# Patient Record
Sex: FEMALE | Race: WHITE | NOT HISPANIC OR LATINO | Employment: UNEMPLOYED | URBAN - METROPOLITAN AREA
[De-identification: names, ages, dates, MRNs, and addresses within clinical notes are randomized per-mention and may not be internally consistent; named-entity substitution may affect disease eponyms.]

---

## 2017-01-17 ENCOUNTER — TELEPHONE (OUTPATIENT)
Dept: MEDICAL GROUP | Age: 52
End: 2017-01-17

## 2017-01-17 NOTE — TELEPHONE ENCOUNTER
1. Caller Name: Kaleigh                                         Call Back Number: [phone] (home)         Patient approves a detailed voicemail message: no    Patient called requesting her labs be sent to her home address.  Address was verified and labs mailed 1/17/17

## 2017-01-31 ENCOUNTER — HOSPITAL ENCOUNTER (OUTPATIENT)
Dept: RADIOLOGY | Facility: MEDICAL CENTER | Age: 52
End: 2017-01-31
Attending: INTERNAL MEDICINE
Payer: COMMERCIAL

## 2017-01-31 DIAGNOSIS — Z78.0 POSTMENOPAUSAL: ICD-10-CM

## 2017-01-31 DIAGNOSIS — R74.8 ALKALINE PHOSPHATASE ELEVATION: ICD-10-CM

## 2017-01-31 PROCEDURE — 77080 DXA BONE DENSITY AXIAL: CPT

## 2017-01-31 PROCEDURE — 76705 ECHO EXAM OF ABDOMEN: CPT

## 2017-02-01 NOTE — PROGRESS NOTES
Quick Note:    I will discuss the result in upcoming appointment.     Maribel Blackman MD    ______

## 2017-03-31 ENCOUNTER — OFFICE VISIT (OUTPATIENT)
Dept: MEDICAL GROUP | Age: 52
End: 2017-03-31
Payer: COMMERCIAL

## 2017-03-31 VITALS
SYSTOLIC BLOOD PRESSURE: 122 MMHG | RESPIRATION RATE: 16 BRPM | HEIGHT: 64 IN | HEART RATE: 74 BPM | BODY MASS INDEX: 26.46 KG/M2 | DIASTOLIC BLOOD PRESSURE: 84 MMHG | OXYGEN SATURATION: 94 % | TEMPERATURE: 98 F | WEIGHT: 155 LBS

## 2017-03-31 DIAGNOSIS — E89.0 POSTSURGICAL HYPOTHYROIDISM: ICD-10-CM

## 2017-03-31 DIAGNOSIS — K59.01 SLOW TRANSIT CONSTIPATION: ICD-10-CM

## 2017-03-31 DIAGNOSIS — F43.23 ADJUSTMENT DISORDER WITH MIXED ANXIETY AND DEPRESSED MOOD: ICD-10-CM

## 2017-03-31 DIAGNOSIS — J06.9 VIRAL UPPER RESPIRATORY TRACT INFECTION: ICD-10-CM

## 2017-03-31 DIAGNOSIS — R23.2 HOT FLASHES: ICD-10-CM

## 2017-03-31 DIAGNOSIS — R74.8 ALKALINE PHOSPHATASE ELEVATION: ICD-10-CM

## 2017-03-31 DIAGNOSIS — Z12.31 VISIT FOR SCREENING MAMMOGRAM: ICD-10-CM

## 2017-03-31 DIAGNOSIS — M85.80 OSTEOPENIA: ICD-10-CM

## 2017-03-31 PROCEDURE — 99214 OFFICE O/P EST MOD 30 MIN: CPT | Performed by: INTERNAL MEDICINE

## 2017-03-31 RX ORDER — CITALOPRAM 20 MG/1
20 TABLET ORAL DAILY
COMMUNITY
End: 2017-03-31

## 2017-03-31 RX ORDER — CITALOPRAM HYDROBROMIDE 10 MG/1
10 TABLET ORAL DAILY
COMMUNITY
End: 2017-12-04

## 2017-03-31 ASSESSMENT — PAIN SCALES - GENERAL: PAINLEVEL: NO PAIN

## 2017-03-31 NOTE — ASSESSMENT & PLAN NOTE
Patient has anxiety, adjustment disorder and mild depressed mood after divorce in 2015. Patient reported that her mood is feeling better and she sleeps better lately. She started taking Celexa 10 mg daily prescribed by psychiatrist. She denied side effects from taking Celexa. She is able to sleep well at night after taking medication. She denies suicidal ideation or plan. She also follows with counselor.

## 2017-03-31 NOTE — ASSESSMENT & PLAN NOTE
Patient reported that her hot flashes improved with Celexa as well. She may know, pulse in February 2015. She has normal Pap smear with gynecologist in June 2016. She also has mammogram in June 2016. We will reorder mammogram for this year. She has normal colonoscopy on 6/19/14.

## 2017-03-31 NOTE — PROGRESS NOTES
Subjective:   Kaleigh Figueroa is a 52 y.o. female here today for evaluation and management of:      Postsurgical hypothyroidism  Patient is taking levothyroxine 88 µg every morning. She tolerates medication without side effect. She follows with endocrinologist, Dr. Saleem. She has thyroid function tests with endocrinologist and stated that the thyroid function test is stable with current regimens. She will have thyroid function tests and follow-up with Dr. Saleem that in April 2017.    Alkaline phosphatase elevation  Patient has elevated alkaline phosphatase at 132 on 12/21/16. She is completely asymptomatic. Repeated thyroid function test was back to normal on 1/17/17. She has negative hepatitis panel on 1/17/17. Her liver ultrasound was normal on 1/31/17.     Adjustment disorder with mixed anxiety and depressed mood  Patient has anxiety, adjustment disorder and mild depressed mood after divorce in 2015. Patient reported that her mood is feeling better and she sleeps better lately. She started taking Celexa 10 mg daily prescribed by psychiatrist. She denied side effects from taking Celexa. She is able to sleep well at night after taking medication. She denies suicidal ideation or plan. She also follows with counselor.     Hot flashes  Patient reported that her hot flashes improved with Celexa as well. She may know, pulse in February 2015. She has normal Pap smear with gynecologist in June 2016. She also has mammogram in June 2016. We will reorder mammogram for this year. She has normal colonoscopy on 6/19/14.    Osteopenia  Patient has DEXA scan on 1/31/17 showed osteopenia. She has not taken vitamin D supplements regularly. She reported that she started having regular physical exercise at gym.    Slow transit constipation  Patient reported that her constipation improved by taking prune. She has Mirilax at home and she takes it as needed only. She does not take Senokot as she does not find it in pharmacy store.  "Overall, her constipation improved.    Viral upper respiratory tract infection  Patient reports that she has mild prostatic cough with runny nose started 3 days ago after she came back from trip. She does not feel short of breath or wheezing. She has mild chest congestion yesterday and subjective feverish and chills. She tries nasal steam treatment. She feels that her symptom is mild and she can manage with over-the-counter cold medications.         Current medicines (including changes today)  Current Outpatient Prescriptions   Medication Sig Dispense Refill   • citalopram (CELEXA) 10 MG tablet Take 10 mg by mouth every day.     • polyethylene glycol/lytes (MIRALAX) Pack Take 17 g by mouth every day.     • Levothyroxine Sodium (SYNTHROID PO) Take 88 mcg by mouth.       No current facility-administered medications for this visit.     She  has a past medical history of Thyroid disease.    ROS   No chest pain, no shortness of breath, no abdominal pain       Objective:     Blood pressure 122/84, pulse 74, temperature 36.7 °C (98 °F), resp. rate 16, height 1.626 m (5' 4\"), weight 70.308 kg (155 lb), last menstrual period 02/01/2016, SpO2 94 %, not currently breastfeeding. Body mass index is 26.59 kg/(m^2).   Physical Exam:  General: Alert, oriented and no acute distress.  Eye contact is good, speech goal directed, affect calm  HEENT: conjunctiva non-injected, sclera non-icteric.  Oral mucous membranes pink and moist with no lesions.  Pinna normal.   Lungs: Normal respiratory effort, clear to auscultation bilaterally with good excursion.  CV: regular rate and rhythm. No murmurs.   Abdomen: soft, non distended, nontender, Bowel sound normal.  Ext: no edema, color normal, vascularity normal, temperature normal        Assessment and Plan:   The following treatment plan was discussed     1. Postsurgical hypothyroidism  - Well-controlled. Continue current regimens. Follows with endocrinologist.  - CBC WITH DIFFERENTIAL; " Future  - TSH; Future  - FREE THYROXINE; Future    2. Alkaline phosphatase elevation  - Improved. Liver ultrasound on 1/31/17 within normal.  - COMP METABOLIC PANEL; Future    3. Adjustment disorder with mixed anxiety and depressed mood  - Well-controlled. Continue current regimen, Celexa 10 mg daily. Recheck lab 1-2 weeks before next follow up visit.  - Discussed with patient regarding the use and side effects of medication as well as black box warning of suicidality risk with medication. Patient verbally understands. Recommend to call 911 or go to ER if patient has suicidal ideation or plan.   - Continue to follow with psychiatrist and counselor as scheduled.    4. Hot flashes  - Improved with Celexa.  - CBC WITH DIFFERENTIAL; Future  - COMP METABOLIC PANEL; Future    5. Osteopenia  - Advised to take vitamin D and calcium supplements daily and to do regular weightbearing exercise.  - VITAMIN D,25 HYDROXY; Future    6. Slow transit constipation  - Improved. Advised to eat more fiber and increase water intake.    7. Viral upper respiratory tract infection  - Likely viral etiology. Patient wants to do over-the-counter regimen for symptomatic treatment. Discussed the nature of disease, contagiousness, prevention of transmission of infection, management of cold symptoms.   - Recommend well hydration with fluid and gurgling with warm salt water every morning and 2-3 times through out the day. Advised to avoid spicy food, alcohol, cold drinks, oily and greasy food until symptoms completely resolve.   - Recommend flonase nasal spray for nasal congestion.   - Recommend to try Cepacol lozenges or spray for sore throat.   - Also discussed the possible secondary bacterial infection develops if symptoms get worse with above cold treatment for 1 week.  - Discussed ED precautions with patient: seek emergency evaluation for symptoms including but not limited to: crushing chest pain, chest pain associated with difficulty  breathing, nausea, or sweats, heart rate irregular or too fast to count, wheezing, high fever, abdominal pain, nausea, vomiting, diarrhea.   - Any change or worsening of signs or symptoms, patient encouraged to follow-up or report to emergency room for further evaluation. Patient understands and agrees.      8. Visit for screening mammogram  - Order screening mammogram.  - MA-SCREEN MAMMO W/CAD-BILAT; Future    Face-to-face time spent 30 minutes with patient and more than half of that time spent for counseling and cooperating of care for medical problems listed above.        Followup: Return in about 9 months (around 12/31/2017), or if symptoms worsen or fail to improve, for hypothyroid, hot flashes, adjustment disorder with anxiety and depressed mood, constipation, osteope.      Please note that this dictation was created using voice recognition software. I have made every reasonable attempt to correct obvious errors, but I expect that there may have unintended errors in text, spelling, punctuation, or grammar that I did not discover.

## 2017-03-31 NOTE — ASSESSMENT & PLAN NOTE
Patient reported that her constipation improved by taking prune. She has Mirilax at home and she takes it as needed only. She does not take Senokot as she does not find it in pharmacy store. Overall, her constipation improved.

## 2017-03-31 NOTE — Clinical Note
[facility]  Maribel Blackman M.D.  [address]  [place] [address]  Fax: [phone]   Authorization for Release/Disclosure of   Protected Health Information   Name: KALEIGH FIGUEROA : 1965 SSN: [id number]   Address: [address] [place] [id number] Phone:    [phone] (home)    I authorize the entity listed below to release/disclose the PHI below to:   [facility]/Maribel Blackman M.D. and Maribel Blackman M.D.   Provider or Entity Name:  [facility]   Address   [facility], [facility], [facility]   Phone:  [phone]    Fax:     Reason for request: continuity of care   Information to be released:    [  ] LAST COLONOSCOPY,  including any PATH REPORT and follow-up  [  ] LAST FIT/COLOGUARD RESULT [  ] LAST DEXA  [  ] LAST MAMMOGRAM  [  ] LAST PAP  [  ] LAST LABS [  ] RETINA EXAM REPORT  [  ] IMMUNIZATION RECORDS  [  ] Release all info      [  ] Check here and initial the line next to each item to release ALL health information INCLUDING  _____ Care and treatment for drug and / or alcohol abuse  _____ HIV testing, infection status, or AIDS  _____ Genetic Testing    DATES OF SERVICE OR TIME PERIOD TO BE DISCLOSED: _____________  I understand and acknowledge that:  * This Authorization may be revoked at any time by you in writing, except if your health information has already been used or disclosed.  * Your health information that will be used or disclosed as a result of you signing this authorization could be re-disclosed by the recipient. If this occurs, your re-disclosed health information may no longer be protected by State or Federal laws.  * You may refuse to sign this Authorization. Your refusal will not affect your ability to obtain treatment.  * This Authorization becomes effective upon signing and will  on (date) __________.      If no date is indicated, this Authorization will  one (1) year from the signature date.    Name: Kaleigh Figueroa    Signature:   Date:        3/31/2017       PLEASE FAX REQUESTED RECORDS BACK TO: [phone]

## 2017-03-31 NOTE — ASSESSMENT & PLAN NOTE
Patient is taking levothyroxine 88 µg every morning. She tolerates medication without side effect. She follows with endocrinologist, Dr. Saleem. She has thyroid function tests with endocrinologist and stated that the thyroid function test is stable with current regimens. She will have thyroid function tests and follow-up with Dr. Saleem that in April 2017.

## 2017-03-31 NOTE — MR AVS SNAPSHOT
"Kaleigh Figueroa   3/31/2017 8:40 AM   Office Visit   MRN: [id number]    Department:  [address] [place] [address]   Dept Phone:  [phone]    Description:  Female : 1965   Provider:  Maribel Blackman M.D.           Reason for Visit     Follow-Up labs, cough with yellow phlegm      Allergies as of 3/31/2017     Allergen Noted Reactions    Codeine 10/27/2016   Itching    Vicodin [Hydrocodone-Acetaminophen] 2015   Itching, Anxiety      You were diagnosed with     Postsurgical hypothyroidism   [244.0.ICD-9-CM]       Alkaline phosphatase elevation   [454038]       Adjustment disorder with mixed anxiety and depressed mood   [309.28.ICD-9-CM]       Hot flashes   [358798]       Visit for screening mammogram   [003428]       Osteopenia   [917893]         Vital Signs     Blood Pressure Pulse Temperature Respirations Height Weight    122/84 mmHg 74 36.7 °C (98 °F) 16 1.626 m (5' 4\") 70.308 kg (155 lb)    Body Mass Index Oxygen Saturation Last Menstrual Period Breastfeeding? Smoking Status       26.59 kg/m2 94% 2016 No Never Smoker        Basic Information     Date Of Birth Sex Race Ethnicity Preferred Language    1965 Female White Non- English      Your appointments     Mar 31, 2017  8:40 AM   Established Patient with Maribel Blackman M.D.   [address] [address] [phone]           You will be receiving a confirmation call a few days before your appointment from our automated call confirmation system.            Dec 29, 2017  8:20 AM   Established Patient with Maribel Blackman M.D.   [address] [address] [phone]           You will be receiving a confirmation call a few days before your appointment from our automated call confirmation system.              Problem List              ICD-10-CM Priority Class Noted - Resolved    Postsurgical hypothyroidism " E89.0   10/27/2016 - Present    Adjustment disorder with mixed anxiety and depressed mood F43.23   10/27/2016 - Present    Hot flashes R23.2   10/27/2016 - Present    Acute cystitis without hematuria N30.00   12/23/2016 - Present    Slow transit constipation K59.01   12/23/2016 - Present    Acute bilateral low back pain without sciatica M54.5   12/23/2016 - Present    Alkaline phosphatase elevation R74.8   12/23/2016 - Present    Osteopenia M85.80   3/31/2017 - Present      Health Maintenance        Date Due Completion Dates    MAMMOGRAM 3/19/2004 ---    PAP SMEAR 6/16/2019 6/16/2016    COLONOSCOPY 6/19/2024 6/19/2014    IMM DTaP/Tdap/Td Vaccine (2 - Td) 12/11/2025 12/11/2015            Current Immunizations     Influenza Vaccine Quad Inj (Preserved) 10/27/2016  4:29 PM    Tdap Vaccine 12/11/2015      Below and/or attached are the medications your provider expects you to take. Review all of your home medications and newly ordered medications with your provider and/or pharmacist. Follow medication instructions as directed by your provider and/or pharmacist. Please keep your medication list with you and share with your provider. Update the information when medications are discontinued, doses are changed, or new medications (including over-the-counter products) are added; and carry medication information at all times in the event of emergency situations     Allergies:  CODEINE - Itching     VICODIN - Itching,Anxiety               Medications  Valid as of: March 31, 2017 -  8:31 AM    Generic Name Brand Name Tablet Size Instructions for use    Citalopram Hydrobromide (Tab) CELEXA 10 MG Take 10 mg by mouth every day.        Levothyroxine Sodium   Take 88 mcg by mouth.        Polyethylene Glycol 3350 (Pack) MIRALAX  Take 17 g by mouth every day.        .                 Medicines prescribed today were sent to:     [id number] - [place], [place] - [address] [address]    Phone:  [phone] Fax: [phone]    Open 24 Hours?: No      Medication refill instructions:       If your prescription bottle indicates you have medication refills left, it is not necessary to call your provider’s office. Please contact your pharmacy and they will refill your medication.    If your prescription bottle indicates you do not have any refills left, you may request refills at any time through one of the following ways: The online Cerephex system (except Urgent Care), by calling your provider’s office, or by asking your pharmacy to contact your provider’s office with a refill request. Medication refills are processed only during regular business hours and may not be available until the next business day. Your provider may request additional information or to have a follow-up visit with you prior to refilling your medication.   *Please Note: Medication refills are assigned a new Rx number when refilled electronically. Your pharmacy may indicate that no refills were authorized even though a new prescription for the same medication is available at the pharmacy. Please request the medicine by name with the pharmacy before contacting your provider for a refill.        Your To Do List     Future Labs/Procedures Complete By Expires    CBC WITH DIFFERENTIAL  As directed 4/1/2018    COMP METABOLIC PANEL  As directed 4/1/2018    FREE THYROXINE  As directed 4/1/2018    MA-SCREEN MAMMO W/CAD-BILAT  As directed 5/2/2018    TSH  As directed 4/1/2018    VITAMIN D,25 HYDROXY  As directed 4/1/2018         Cerephex Access Code: [email or web address]  Expires: 4/30/2017  8:31 AM    Cerephex  A secure, online tool to manage your health information     [employer]® is a secure, online tool that connects you to your personalized health information from the privacy of your home -- day or night - making it very easy for you to manage your healthcare. Once the activation process is completed, you can even access your medical  information using the Adapx manjit, which is available for free in the Apple Manjit store or Google Play store.     Adapx provides the following levels of access (as shown below):   My Chart Features   [facility] Doctor [facility]  Specialists [facility]  Urgent  Care Non-[facility]  Primary Care  Doctor   Email your healthcare team securely and privately 24/7 X X X    Manage appointments: schedule your next appointment; view details of past/upcoming appointments X      Request prescription refills. X      View recent personal medical records, including lab and immunizations X X X X   View health record, including health history, allergies, medications X X X X   Read reports about your outpatient visits, procedures, consult and ER notes X X X X   See your discharge summary, which is a recap of your hospital and/or ER visit that includes your diagnosis, lab results, and care plan. X X       How to register for Adapx:  1. Go to  [email or web address].  2. Click on the Sign Up Now box, which takes you to the New Member Sign Up page. You will need to provide the following information:  a. Enter your Adapx Access Code exactly as it appears at the top of this page. (You will not need to use this code after you’ve completed the sign-up process. If you do not sign up before the expiration date, you must request a new code.)   b. Enter your date of birth.   c. Enter your home email address.   d. Click Submit, and follow the next screen’s instructions.  3. Create a Adapx ID. This will be your Adapx login ID and cannot be changed, so think of one that is secure and easy to remember.  4. Create a Adapx password. You can change your password at any time.  5. Enter your Password Reset Question and Answer. This can be used at a later time if you forget your password.   6. Enter your e-mail address. This allows you to receive e-mail notifications when new information is available in Adapx.  7. Click Sign Up. You can now  view your health information.    For assistance activating your [employer] account, call [phone]

## 2017-03-31 NOTE — ASSESSMENT & PLAN NOTE
Patient reports that she has mild prostatic cough with runny nose started 3 days ago after she came back from trip. She does not feel short of breath or wheezing. She has mild chest congestion yesterday and subjective feverish and chills. She tries nasal steam treatment. She feels that her symptom is mild and she can manage with over-the-counter cold medications.

## 2017-03-31 NOTE — ASSESSMENT & PLAN NOTE
Patient has DEXA scan on 1/31/17 showed osteopenia. She has not taken vitamin D supplements regularly. She reported that she started having regular physical exercise at gym.

## 2017-03-31 NOTE — ASSESSMENT & PLAN NOTE
Patient has elevated alkaline phosphatase at 132 on 12/21/16. She is completely asymptomatic. Repeated thyroid function test was back to normal on 1/17/17. She has negative hepatitis panel on 1/17/17. Her liver ultrasound was normal on 1/31/17.

## 2017-04-05 ENCOUNTER — HOSPITAL ENCOUNTER (OUTPATIENT)
Dept: RADIOLOGY | Facility: MEDICAL CENTER | Age: 52
End: 2017-04-05

## 2017-06-01 ENCOUNTER — OFFICE VISIT (OUTPATIENT)
Dept: MEDICAL GROUP | Age: 52
End: 2017-06-01
Payer: COMMERCIAL

## 2017-06-01 VITALS
SYSTOLIC BLOOD PRESSURE: 122 MMHG | DIASTOLIC BLOOD PRESSURE: 82 MMHG | BODY MASS INDEX: 25.95 KG/M2 | HEIGHT: 64 IN | OXYGEN SATURATION: 97 % | TEMPERATURE: 97.5 F | HEART RATE: 91 BPM | WEIGHT: 152 LBS

## 2017-06-01 DIAGNOSIS — F43.23 ADJUSTMENT DISORDER WITH MIXED ANXIETY AND DEPRESSED MOOD: ICD-10-CM

## 2017-06-01 DIAGNOSIS — E89.0 POSTSURGICAL HYPOTHYROIDISM: ICD-10-CM

## 2017-06-01 DIAGNOSIS — T07.XXXA MULTIPLE BRUISES: ICD-10-CM

## 2017-06-01 DIAGNOSIS — K59.01 SLOW TRANSIT CONSTIPATION: ICD-10-CM

## 2017-06-01 PROBLEM — J06.9 VIRAL UPPER RESPIRATORY TRACT INFECTION: Status: RESOLVED | Noted: 2017-03-31 | Resolved: 2017-06-01

## 2017-06-01 PROCEDURE — 99214 OFFICE O/P EST MOD 30 MIN: CPT | Performed by: INTERNAL MEDICINE

## 2017-06-01 ASSESSMENT — PAIN SCALES - GENERAL: PAINLEVEL: NO PAIN

## 2017-06-02 NOTE — ASSESSMENT & PLAN NOTE
Patient is taking Celexa 10 mg daily. She stated that her mood is stable with Celexa. She did not have any side effects from taking Celexa. She denies suicidal ideation or plan.

## 2017-06-02 NOTE — ASSESSMENT & PLAN NOTE
Patient stated that constipation improved and she does not take MiraLAX anymore. She tries to control her bowel movement with diet.

## 2017-06-02 NOTE — ASSESSMENT & PLAN NOTE
This is a new problem. Patient stated that she notice bruise on her upper arms and legs recently. Her friend also noticed it. She denied other symptoms such as gum bleeding, petechia rash or blood in stool or blood in urine or other abnormal bleeding. She has normal platelet count in previous lab. She is not taking any blood thinner. She is taking Celexa 10 mg daily which can cause abnormal bleeding with platelet dysfunction. But she does not have any signs of other abnormal bleeding except bruise. After detailed discussion all possible causes, medication review and lab review, patient stated that she just moved to new house and she did a lot of moving and carrying heavy stuff during moving. She realized that she may bump her upper arms and her lower extremities during moving.

## 2017-06-02 NOTE — PROGRESS NOTES
Subjective:   Kaleigh Figueroa is a 52 y.o. female here today for evaluation and management of:      Multiple bruises on upper and lower extremities  This is a new problem. Patient stated that she notice bruise on her upper arms and legs recently. Her friend also noticed it. She denied other symptoms such as gum bleeding, petechia rash or blood in stool or blood in urine or other abnormal bleeding. She has normal platelet count in previous lab. She is not taking any blood thinner. She is taking Celexa 10 mg daily which can cause abnormal bleeding with platelet dysfunction. But she does not have any signs of other abnormal bleeding except bruise. After detailed discussion all possible causes, medication review and lab review, patient stated that she just moved to new house and she did a lot of moving and carrying heavy stuff during moving. She realized that she may bump her upper arms and her lower extremities during moving.    Postsurgical hypothyroidism  Patient is taking levothyroxine 88 µg every morning. She denies side effects from taking levothyroxine. She denies signs and symptoms of hypo-or hyperthyroidism.     Adjustment disorder with mixed anxiety and depressed mood  Patient is taking Celexa 10 mg daily. She stated that her mood is stable with Celexa. She did not have any side effects from taking Celexa. She denies suicidal ideation or plan.    Slow transit constipation  Patient stated that constipation improved and she does not take MiraLAX anymore. She tries to control her bowel movement with diet.         Current medicines (including changes today)  Current Outpatient Prescriptions   Medication Sig Dispense Refill   • citalopram (CELEXA) 10 MG tablet Take 10 mg by mouth every day.     • Levothyroxine Sodium (SYNTHROID PO) Take 88 mcg by mouth.       No current facility-administered medications for this visit.     She  has a past medical history of Thyroid disease.    ROS   No chest pain, no shortness  "of breath, no abdominal pain       Objective:     Blood pressure 122/82, pulse 91, temperature 36.4 °C (97.5 °F), height 1.626 m (5' 4\"), weight 68.947 kg (152 lb), SpO2 97 %, not currently breastfeeding. Body mass index is 26.08 kg/(m^2).   Physical Exam:  General: Alert, oriented and no acute distress.  Eye contact is good, speech goal directed, affect calm  HEENT: conjunctiva non-injected, sclera non-icteric.  Oral mucous membranes pink and moist with no lesions.  Pinna normal.   Lungs: Normal respiratory effort, clear to auscultation bilaterally with good excursion.  CV: regular rate and rhythm. No murmurs.   Abdomen: soft, non distended, nontender, Bowel sound normal.  Ext: no edema, color normal, vascularity normal, temperature normal  Musculoskeletal exam:  3 Bruises on upper extremities and one bruise on lower extremity, No bruise on trunk. Mild varicose veins on lower extremities.      Assessment and Plan:   The following treatment plan was discussed     1. Multiple bruises on upper and lower extremities  - It is likely from injury from bumping during moving. No other signs of significant bleeding or bleeding disorder. Review previous lab, current medication with patient.  - Provided reassurance.    2. Postsurgical hypothyroidism  - Well-controlled. Continue current regimen, levothyroxine 88 mcg every morning with empty stomach. Recheck lab 1-2 weeks before next follow up visit.  - Reprinted blood test orders for patient.     3. Adjustment disorder with mixed anxiety and depressed mood  - Well-controlled. Continue Celexa 10 mg daily. Reviewed the side effect of Celexa with patient. Recheck lab 1-2 weeks before next follow up visit.    4. Slow transit constipation  - Improved. Discontinue MiraLAX.  - Discussed to eat high fiber diet and increase water intake.         Followup: Return in about 7 months (around 12/29/2017), or if symptoms worsen or fail to improve, for hypothyroid, depression and anxiety, " osteopenia, lab review.      Please note that this dictation was created using voice recognition software. I have made every reasonable attempt to correct obvious errors, but I expect that there may have unintended errors in text, spelling, punctuation, or grammar that I did not discover.

## 2017-06-02 NOTE — ASSESSMENT & PLAN NOTE
Patient is taking levothyroxine 88 µg every morning. She denies side effects from taking levothyroxine. She denies signs and symptoms of hypo-or hyperthyroidism.

## 2017-06-09 ENCOUNTER — HOSPITAL ENCOUNTER (OUTPATIENT)
Dept: RADIOLOGY | Facility: MEDICAL CENTER | Age: 52
End: 2017-06-09
Attending: INTERNAL MEDICINE
Payer: COMMERCIAL

## 2017-06-09 DIAGNOSIS — Z12.31 VISIT FOR SCREENING MAMMOGRAM: ICD-10-CM

## 2017-06-09 PROCEDURE — G0202 SCR MAMMO BI INCL CAD: HCPCS

## 2017-06-14 ENCOUNTER — TELEPHONE (OUTPATIENT)
Dept: MEDICAL GROUP | Age: 52
End: 2017-06-14

## 2017-06-14 DIAGNOSIS — R92.30 DENSE BREAST TISSUE ON MAMMOGRAM: ICD-10-CM

## 2017-06-14 NOTE — TELEPHONE ENCOUNTER
Phone Number Called: [phone] (home)     Message: Pt states that she would like the SonoCine ordered.    Left Message for patient to call back: no

## 2017-06-14 NOTE — TELEPHONE ENCOUNTER
Phone Number Called: [phone] (home)     Message: Attempted to reach patient. Phone just kept ringing. Will try again later.    Left Message for patient to call back: no

## 2017-06-14 NOTE — TELEPHONE ENCOUNTER
----- Message from Maribel Blackman M.D. sent at 6/13/2017  7:18 PM PDT -----  Please advise patient her mammo was normal but it did reveal that she has dense breasts.   Dense breasts may obscure small masses on typical mammogram studies. SonoCine is a new technique that is being used to screen for breast cancer in women with dense breasts. This study is not yet covered by insurance and is $125 out of pocket. If she is interested in having this study done, please contact us for an order. Otherwise, it is recommended that she follow up in one year for her screening mammogram.     Maribel Blackman M.D.

## 2017-06-15 NOTE — TELEPHONE ENCOUNTER
Phone Number Called: [phone] (home)     Message: Pt notified that SonoCine was ordered.    Left Message for patient to call back: yes

## 2017-07-20 ENCOUNTER — TELEPHONE (OUTPATIENT)
Dept: MEDICAL GROUP | Age: 52
End: 2017-07-20

## 2017-07-20 DIAGNOSIS — Z12.83 SKIN CANCER SCREENING: ICD-10-CM

## 2017-07-20 NOTE — TELEPHONE ENCOUNTER
1. Caller Name: Kaleigh Figueroa                                         Call Back Number: [phone] (home)       Patient approves a detailed voicemail message: N\A    Patient would like to know if she can get a referral to the Dermatologist or if she needs to come see you first. Please Advise.

## 2017-07-20 NOTE — Clinical Note
July 21, 2017        Kaleigh Fiugeroa  [address] [facility] [address]        Dear Kaleigh:    Dr. Blackman's office has been unable to reach you. Please call our office at [phone]. Thank you.      If you have any questions or concerns, please don't hesitate to call.        Sincerely,        Danna Arellano, Med Ass't      Electronically Signed

## 2017-07-20 NOTE — TELEPHONE ENCOUNTER
Please let patient know that I place the referral to Dermatologist for skin cancer screening unless she has specific lesion. She will receive the call from referral coordinator regarding dermatology referral in 1 week.    Thanks!  Maribel lBackman M.D.

## 2017-07-21 NOTE — TELEPHONE ENCOUNTER
Phone Number Called: [phone] (home)     Message: Left VM for patient to call back, letter mailed    Left Message for patient to call back: yes

## 2017-07-21 NOTE — TELEPHONE ENCOUNTER
Phone Number Called: [phone] (home)     Message: LVM for patient to return call regarding her referral.    Left Message for patient to call back: yes

## 2017-11-07 ENCOUNTER — OFFICE VISIT (OUTPATIENT)
Dept: MEDICAL GROUP | Facility: MEDICAL CENTER | Age: 52
End: 2017-11-07
Payer: COMMERCIAL

## 2017-11-07 VITALS
HEART RATE: 74 BPM | OXYGEN SATURATION: 95 % | SYSTOLIC BLOOD PRESSURE: 140 MMHG | BODY MASS INDEX: 29.53 KG/M2 | DIASTOLIC BLOOD PRESSURE: 80 MMHG | TEMPERATURE: 97.3 F | HEIGHT: 64 IN | WEIGHT: 173 LBS

## 2017-11-07 DIAGNOSIS — J02.9 SORE THROAT: ICD-10-CM

## 2017-11-07 DIAGNOSIS — J06.9 UPPER RESPIRATORY TRACT INFECTION, UNSPECIFIED TYPE: ICD-10-CM

## 2017-11-07 DIAGNOSIS — Z00.00 HEALTH CARE MAINTENANCE: ICD-10-CM

## 2017-11-07 PROCEDURE — 99213 OFFICE O/P EST LOW 20 MIN: CPT | Performed by: INTERNAL MEDICINE

## 2017-11-07 ASSESSMENT — PATIENT HEALTH QUESTIONNAIRE - PHQ9: CLINICAL INTERPRETATION OF PHQ2 SCORE: 0

## 2017-11-07 NOTE — PROGRESS NOTES
CHIEF COMPLAINT  Chief Complaint   Patient presents with   • Fever       HPI  Patient is a 52 y.o. female patient who presents today for the following     Sinus pain  The patient got sick 5 days ago, worse for the last 2-3 days, with:   - HA on the R side, sore throat  - Fatigue  - Chills, body aches,  - Cough w/o sputum  - Decreased appetite,  - R nasal congestion, w/o nasal discharge, postnasal drip, pain in sinus areas  - subjective fever last night    Denies:   Earache, swollen glands, difficulty swallowing  Wheezing, chest pain   Nausea, vomiting, diarrhea, abdominal pain  Skin rash.  Meds used: Excedrin miograine    Sick contact:  neg  Flu vaccine: neg    Reviewed PMH, PSH, FH, SH, ALL, HCM/IMM, no changes  Reviewed MEDS, no changes    Patient Active Problem List    Diagnosis Date Noted   • Multiple bruises on upper and lower extremities 06/01/2017   • Osteopenia 03/31/2017   • Slow transit constipation 12/23/2016   • Acute bilateral low back pain without sciatica 12/23/2016   • Alkaline phosphatase elevation 12/23/2016   • Postsurgical hypothyroidism 10/27/2016   • Adjustment disorder with mixed anxiety and depressed mood 10/27/2016   • Hot flashes 10/27/2016     CURRENT MEDICATIONS  Current Outpatient Prescriptions   Medication Sig Dispense Refill   • citalopram (CELEXA) 10 MG tablet Take 10 mg by mouth every day.     • Levothyroxine Sodium (SYNTHROID PO) Take 88 mcg by mouth.       No current facility-administered medications for this visit.      ALLERGIES  Allergies: Codeine and Vicodin [hydrocodone-acetaminophen]  PAST MEDICAL HISTORY  Past Medical History:   Diagnosis Date   • Thyroid disease      SURGICAL HISTORY  She  has a past surgical history that includes thyroidectomy (2003).  SOCIAL HISTORY  Social History   Substance Use Topics   • Smoking status: Never Smoker   • Smokeless tobacco: Never Used   • Alcohol use No     Social History     Social History Narrative   • No narrative on file     FAMILY  "HISTORY  Family History   Problem Relation Age of Onset   • Thyroid Father      Hashimotos   • Diabetes Maternal Grandmother    • Anxiety disorder Mother      Family Status   Relation Status   • Father Alive   • Maternal Grandmother Alive    97   • Mother Alive   • Maternal Grandfather  at age 42    heart attack   • Paternal Grandmother  at age 95    aging   • Paternal Grandfather    • Son Alive   • Son Alive   • Son Alive     ROS   Constitutional: as above.  HENT: Negative for congestion, sore throat. And per HPI.  Eyes: Negative for blurred vision.   Respiratory: Negative for cough, shortness of breath.  Cardiovascular: Negative for chest pain, palpitations.   Gastrointestinal: Negative for heartburn, nausea, abdominal pain.   Genitourinary: Negative for dysuria.  Musculoskeletal: Negative for significant myalgias, back pain and joint pain.   Skin: Negative for rash and itching.   Neuro: Negative for dizziness, weakness and headaches.   Endo/Heme/Allergies: Does not bruise/bleed easily.   Psychiatric/Behavioral: Negative for depression, anxiety    PHYSICAL EXAM   Blood pressure 140/80, pulse 74, temperature 36.3 °C (97.3 °F), height 1.626 m (5' 4\"), weight 78.5 kg (173 lb), SpO2 95 %, not currently breastfeeding. Body mass index is 29.7 kg/m².  General:  NAD, well appearing  HEENT:   NC/AT, PERRLA, EOMI, TMs are clear. Nasoharyngeal mucosa is erythematous,  without lesions;  no cervical / supraclavicular  lymphadenopathy, no thyromegaly.    TTP over the R maxillary sinus.  Cardiovascular: RRR.   No m/r/g. No carotid bruits .      Lungs:   CTAB, no w/r/r, no respiratory distress.  Abdomen: Soft, NT/ND + BS; no suprapubic tenderness; no masses or hepatosplenomegaly.  Extremities:  2+ DP and radial pulses bilaterally.  No c/c/e.   Skin:  Warm, dry.  No erythema. No rash.   Neurologic: Alert & oriented x 3. No focal deficits.  Psychiatric:  Affect normal, mood normal, judgment normal.    LABS "     Labs are reviewed and discussed with a patient    Lab Results   Component Value Date/Time    CHOLSTRLTOT 173 12/21/2016 02:21 PM    LDL 99 12/21/2016 02:21 PM    HDL 61 12/21/2016 02:21 PM    TRIGLYCERIDE 64 12/21/2016 02:21 PM       Lab Results   Component Value Date/Time    SODIUM 145 (H) 01/17/2017 12:22 PM    POTASSIUM 4.7 01/17/2017 12:22 PM    CHLORIDE 105 01/17/2017 12:22 PM    CO2 25 01/17/2017 12:22 PM    GLUCOSE 90 01/17/2017 12:22 PM    BUN 12 01/17/2017 12:22 PM    CREATININE 0.68 01/17/2017 12:22 PM    BUNCREATRAT 18 01/17/2017 12:22 PM     Lab Results   Component Value Date/Time    ALKPHOSPHAT 115 01/17/2017 12:22 PM    ASTSGOT 15 01/17/2017 12:22 PM    ALTSGPT 16 01/17/2017 12:22 PM    TBILIRUBIN 0.4 01/17/2017 12:22 PM      No results found for: HBA1C  No results found for: TSH  No results found for: FREET4    IMAGING     None    ASSESMENT AND PLAN        1. Sore throat  2. Upper respiratory tract infection, unspecified type  Advised   - increase fluid intake;   - may take Tylenol/ibuprofen for fever, pain; nasal decongestant   - may apply warm packs over sinus areas     3. Health care maintenance  Advised to get flu vaccine.    Counseling:   - Smoking:  Nonsmoker    Followup: Return if symptoms worsen or fail to improve.    All questions are answered.    Please note that this dictation was created using voice recognition software, and that there might be errors of debra and possibly content.

## 2017-11-29 ENCOUNTER — APPOINTMENT (RX ONLY)
Dept: URBAN - METROPOLITAN AREA CLINIC 20 | Facility: CLINIC | Age: 52
Setting detail: DERMATOLOGY
End: 2017-11-29

## 2017-11-29 DIAGNOSIS — D22 MELANOCYTIC NEVI: ICD-10-CM

## 2017-11-29 DIAGNOSIS — L98.8 OTHER SPECIFIED DISORDERS OF THE SKIN AND SUBCUTANEOUS TISSUE: ICD-10-CM

## 2017-11-29 PROBLEM — D48.5 NEOPLASM OF UNCERTAIN BEHAVIOR OF SKIN: Status: ACTIVE | Noted: 2017-11-29

## 2017-11-29 PROCEDURE — 11100: CPT

## 2017-11-29 PROCEDURE — ? COUNSELING

## 2017-11-29 PROCEDURE — 99202 OFFICE O/P NEW SF 15 MIN: CPT | Mod: 25

## 2017-11-29 PROCEDURE — ? BIOPSY BY SHAVE METHOD

## 2017-11-29 ASSESSMENT — LOCATION DETAILED DESCRIPTION DERM
LOCATION DETAILED: RIGHT DORSAL SMALL FINGER METACARPOPHALANGEAL JOINT
LOCATION DETAILED: RIGHT INFERIOR VERMILION LIP

## 2017-11-29 ASSESSMENT — LOCATION ZONE DERM
LOCATION ZONE: LIP
LOCATION ZONE: HAND

## 2017-11-29 ASSESSMENT — LOCATION SIMPLE DESCRIPTION DERM
LOCATION SIMPLE: RIGHT HAND
LOCATION SIMPLE: RIGHT LIP

## 2017-11-29 NOTE — HPI: SKIN LESION
Is This A New Presentation, Or A Follow-Up?: Skin Lesion
How Severe Is Your Skin Lesion?: mild
Has Your Skin Lesion Been Treated?: not been treated
Additional History: Spot appears to be losing color

## 2017-11-29 NOTE — PROCEDURE: BIOPSY BY SHAVE METHOD
Electrodesiccation And Curettage Text: The wound bed was treated with electrodesiccation and curettage after the biopsy was performed.
Detail Level: Detailed
Curettage Text: The wound bed was treated with curettage after the biopsy was performed.
Bill 72917 For Specimen Handling/Conveyance To Laboratory?: no
Consent: Written consent was obtained and risks were reviewed including but not limited to scarring, infection, bleeding, scabbing, incomplete removal, nerve damage and allergy to anesthesia.
Additional Anesthesia Volume In Cc (Will Not Render If 0): 0
Billing Type: Third-Party Bill
X Size Of Lesion In Cm: 0.5
Biopsy Method: Personna blade
Anesthesia Volume In Cc: 0.2
Dressing: Band-Aid
Lab: [id number]
Anesthesia Type: 1% lidocaine with 1:100,000 epinephrine and 408mcg clindamycin/ml and a 1:10 solution of 8.4% sodium bicarbonate
Wound Care: Aquaphor
Hemostasis: Drysol and Electrocautery
Cryotherapy Text: The wound bed was treated with cryotherapy after the biopsy was performed.
Silver Nitrate Text: The wound bed was treated with silver nitrate after the biopsy was performed.
Post-Care Instructions: I reviewed with the patient in detail post-care instructions. Patient is to keep the biopsy site dry overnight, and then apply bacitracin twice daily until healed. Patient may apply hydrogen peroxide soaks to remove any crusting.
Notification Instructions: Patient will be notified of biopsy results. However, patient instructed to call the office if not contacted within 2 weeks.
Lab Facility: 
Type Of Destruction Used: Curettage
Electrodesiccation Text: The wound bed was treated with electrodesiccation after the biopsy was performed.
Biopsy Type: H and E

## 2017-12-02 NOTE — ASSESSMENT & PLAN NOTE
She took Celexa 10 mg daily for 1 month and reported mild headache and nausea from taking it. Her mood is stable and well controlled. She denies suicidal ideation or plan or homicidal ideation or plan. She stopped taking it in March 2017.

## 2017-12-02 NOTE — ASSESSMENT & PLAN NOTE
Stable. Currently taking levothyroxine 88 mcg daily as prescribed.  Denies palpitations, skin changes, temperature intolerance, or changes in bowel habits.    Results for ADEN TORRES (MRN [id number]) as of 12/2/2017 12:07   Ref. Range 11/30/2017 09:13   TSH Latest Ref Range: 0.450 - 4.500 uIU/mL 1.270   Free T-4 Latest Ref Range: 0.82 - 1.77 ng/dL 1.90 (H)

## 2017-12-02 NOTE — ASSESSMENT & PLAN NOTE
Patient is taking vitamin D supplements daily. Last DEXA scan on 1/31/17, showed osteopenia. Patient stated that she goes to gym for regular physical exercise.

## 2017-12-04 ENCOUNTER — OFFICE VISIT (OUTPATIENT)
Dept: MEDICAL GROUP | Age: 52
End: 2017-12-04
Payer: COMMERCIAL

## 2017-12-04 VITALS
WEIGHT: 170 LBS | DIASTOLIC BLOOD PRESSURE: 82 MMHG | BODY MASS INDEX: 29.02 KG/M2 | HEIGHT: 64 IN | OXYGEN SATURATION: 96 % | HEART RATE: 77 BPM | TEMPERATURE: 98.1 F | SYSTOLIC BLOOD PRESSURE: 120 MMHG

## 2017-12-04 DIAGNOSIS — Z23 NEED FOR VACCINATION: ICD-10-CM

## 2017-12-04 DIAGNOSIS — M85.80 OSTEOPENIA, UNSPECIFIED LOCATION: ICD-10-CM

## 2017-12-04 DIAGNOSIS — Z13.220 SCREENING FOR LIPID DISORDERS: ICD-10-CM

## 2017-12-04 DIAGNOSIS — E89.0 POSTSURGICAL HYPOTHYROIDISM: ICD-10-CM

## 2017-12-04 DIAGNOSIS — F43.23 ADJUSTMENT DISORDER WITH MIXED ANXIETY AND DEPRESSED MOOD: ICD-10-CM

## 2017-12-04 PROBLEM — T07.XXXA MULTIPLE BRUISES: Status: RESOLVED | Noted: 2017-06-01 | Resolved: 2017-12-04

## 2017-12-04 PROCEDURE — 99214 OFFICE O/P EST MOD 30 MIN: CPT | Mod: 25 | Performed by: INTERNAL MEDICINE

## 2017-12-04 PROCEDURE — 90686 IIV4 VACC NO PRSV 0.5 ML IM: CPT | Performed by: INTERNAL MEDICINE

## 2017-12-04 PROCEDURE — 90471 IMMUNIZATION ADMIN: CPT | Performed by: INTERNAL MEDICINE

## 2017-12-04 NOTE — PROGRESS NOTES
"Subjective:   Kaleigh Figueroa is a 52 y.o. female here today for evaluation and management of:      Postsurgical hypothyroidism  Stable. Currently taking levothyroxine 88 mcg daily as prescribed.  Denies palpitations, skin changes, temperature intolerance, or changes in bowel habits.    Results for KALEIGH FIGUEROA (MRN [id number]) as of 12/2/2017 12:07   Ref. Range 11/30/2017 09:13   TSH Latest Ref Range: 0.450 - 4.500 uIU/mL 1.270   Free T-4 Latest Ref Range: 0.82 - 1.77 ng/dL 1.90 (H)         Osteopenia  Patient is taking vitamin D supplements daily. Last DEXA scan on 1/31/17, showed osteopenia. Patient stated that she goes to gym for regular physical exercise.    Adjustment disorder with mixed anxiety and depressed mood  She took Celexa 10 mg daily for 1 month and reported mild headache and nausea from taking it. Her mood is stable and well controlled. She denies suicidal ideation or plan or homicidal ideation or plan. She stopped taking it in March 2017.          Current medicines (including changes today)  Current Outpatient Prescriptions   Medication Sig Dispense Refill   • Calcium Citrate-Vitamin D (CITRACAL + D PO) Take  by mouth.     • Levothyroxine Sodium (SYNTHROID PO) Take 88 mcg by mouth.       No current facility-administered medications for this visit.      She  has a past medical history of Thyroid disease.    ROS   No chest pain, no shortness of breath, no abdominal pain       Objective:     Blood pressure 120/82, pulse 77, temperature 36.7 °C (98.1 °F), height 1.626 m (5' 4\"), weight 77.1 kg (170 lb), SpO2 96 %. Body mass index is 29.18 kg/m².   Physical Exam:  General: Alert, oriented and no acute distress.  Eye contact is good, speech goal directed, affect calm  HEENT: conjunctiva non-injected, sclera non-icteric.  Oral mucous membranes pink and moist with no lesions.  Pinna normal.   Lungs: Normal respiratory effort, clear to auscultation bilaterally with good excursion.  CV: regular rate " and rhythm. No murmurs.  Abdomen: soft, non distended, nontender, Bowel sound normal.  Ext: no edema, color normal, vascularity normal, temperature normal        Assessment and Plan:   The following treatment plan was discussed     1. Postsurgical hypothyroidism  - Well-controlled. Continue current regimens. Recheck lab 1-2 weeks before next follow up visit.  - COMP METABOLIC PANEL; Future  - LIPID PROFILE; Future  - TSH; Future  - FREE THYROXINE; Future    2. Osteopenia, unspecified location  - Stable. Continue vitamin D and calcium supplements daily. Recommend to do regular weightbearing exercise.  - COMP METABOLIC PANEL; Future  - VITAMIN D,25 HYDROXY; Future    3. Adjustment disorder with mixed anxiety and depressed mood  - Improved. Stopped taking Celexa in March 2017. Continue to monitor.  - COMP METABOLIC PANEL; Future    4. Screening for lipid disorders  - Advised to eat low fat, low carbohydrate and high fiber diet as well as do cardio physical exercise regularly.   - LIPID PROFILE; Future    5. Need for vaccination  - Influenza vaccine was given today after reviewing risks and benefits as well as side effects of vaccine.  - Flu Quad Inj >3 Year Pre-Filled (Preservative Free)      Followup: Return in about 6 months (around 6/4/2018), or if symptoms worsen or fail to improve, for hypothyroid, osteopenia, hot flashes, and lab review.      Please note that this dictation was created using voice recognition software. I have made every reasonable attempt to correct obvious errors, but I expect that there may have unintended errors in text, spelling, punctuation, or grammar that I did not discover.

## 2018-05-29 ENCOUNTER — OFFICE VISIT (OUTPATIENT)
Dept: URGENT CARE | Facility: PHYSICIAN GROUP | Age: 53
End: 2018-05-29
Payer: COMMERCIAL

## 2018-05-29 ENCOUNTER — HOSPITAL ENCOUNTER (OUTPATIENT)
Dept: RADIOLOGY | Facility: MEDICAL CENTER | Age: 53
End: 2018-05-29
Attending: PHYSICIAN ASSISTANT
Payer: COMMERCIAL

## 2018-05-29 VITALS
WEIGHT: 175 LBS | HEART RATE: 72 BPM | RESPIRATION RATE: 16 BRPM | TEMPERATURE: 97.8 F | SYSTOLIC BLOOD PRESSURE: 134 MMHG | DIASTOLIC BLOOD PRESSURE: 74 MMHG | OXYGEN SATURATION: 97 % | BODY MASS INDEX: 29.88 KG/M2 | HEIGHT: 64 IN

## 2018-05-29 DIAGNOSIS — S92.511A CLOSED DISPLACED FRACTURE OF PROXIMAL PHALANX OF LESSER TOE OF RIGHT FOOT, INITIAL ENCOUNTER: ICD-10-CM

## 2018-05-29 DIAGNOSIS — M79.674 TOE PAIN, RIGHT: ICD-10-CM

## 2018-05-29 PROCEDURE — 99213 OFFICE O/P EST LOW 20 MIN: CPT | Performed by: PHYSICIAN ASSISTANT

## 2018-05-29 PROCEDURE — 73660 X-RAY EXAM OF TOE(S): CPT | Mod: RT

## 2018-05-29 ASSESSMENT — ENCOUNTER SYMPTOMS
CHILLS: 0
FOCAL WEAKNESS: 0
SENSORY CHANGE: 0
TINGLING: 0
FEVER: 0
JOINT SWELLING: 1
NUMBNESS: 0
ARTHRALGIAS: 1
WEAKNESS: 0

## 2018-05-29 ASSESSMENT — PAIN SCALES - GENERAL: PAINLEVEL: 6=MODERATE PAIN

## 2018-05-30 NOTE — PROGRESS NOTES
"Subjective:      Kaleigh Figueroa is a 53 y.o. female who presents with Toe Injury (Rt pinky toe, swelling/pain, can't bend x1day )            Toe Injury   This is a new problem. The current episode started today (patient got right 5th toe underneath door while walking- toe bent backward.). The problem occurs constantly. The problem has been unchanged. Associated symptoms include arthralgias (right 5th toe pain, swelling, bruising ) and joint swelling. Pertinent negatives include no chills, fever, numbness, rash or weakness. Exacerbated by: bearing weight, trying to move toe. She has tried ice (elevation) for the symptoms. The treatment provided no relief.     Past Medical History:   Diagnosis Date   • Thyroid disease      Past Surgical History:   Procedure Laterality Date   • THYROIDECTOMY  2003       Family History   Problem Relation Age of Onset   • Thyroid Father      Hashimotos   • Diabetes Maternal Grandmother    • Anxiety disorder Mother        Allergies   Allergen Reactions   • Codeine Itching   • Vicodin [Hydrocodone-Acetaminophen] Itching and Anxiety       Medications, Allergies, and current problem list reviewed today in Epic    Review of Systems   Constitutional: Negative for chills and fever.   Musculoskeletal: Positive for arthralgias (right 5th toe pain, swelling, bruising ), joint pain and joint swelling.   Skin: Negative for rash.   Neurological: Negative for tingling, sensory change, focal weakness, weakness and numbness.     All other systems reviewed and are negative.        Objective:     /74   Pulse 72   Temp 36.6 °C (97.8 °F)   Resp 16   Ht 1.626 m (5' 4\")   Wt 79.4 kg (175 lb)   SpO2 97%   BMI 30.04 kg/m²      Physical Exam   Constitutional: She is oriented to person, place, and time. She appears well-developed and well-nourished. No distress.   Pulmonary/Chest: Effort normal. No respiratory distress.   Musculoskeletal:        Feet:    Neurological: She is alert and oriented " to person, place, and time. No cranial nerve deficit.   Psychiatric: She has a normal mood and affect. Her behavior is normal. Judgment and thought content normal.           5/29/2018 6:29 PM    HISTORY/REASON FOR EXAM:  Right 5th toe pain after contusion injury.      TECHNIQUE/EXAM DESCRIPTION AND NUMBER OF VIEWS:  3 views of the RIGHT toes.    COMPARISON: None    FINDINGS:  There is a mildly displaced oblique fracture of the right 5th digit proximal phalanx. This does not involve either articular surface.    Remainder of the right foot is intact.   Impression       1.  There is a mildly displaced oblique right 5th digit proximal phalanx fracture.            Assessment/Plan:     1. Closed displaced fracture of proximal phalanx of lesser toe of right foot, initial encounter  DX-TOE(S) 2+ RIGHT    REFERRAL TO ORTHOPEDICS       - DX-TOE(S) 2+ RIGHT; Future  - REFERRAL TO ORTHOPEDICS    - CAM walking boot given  - crutches  - non-weight bearing  - follow-up with Ortho  - referral placed.  - OTC ICE, rest, NSAIDS       Differential diagnoses, Supportive care, and indications for immediate follow-up discussed with patient.   Instructed to return to clinic or nearest emergency department for any change in condition, further concerns, or worsening of symptoms.    The patient demonstrated a good understanding and agreed with the treatment plan.    Candis Alba P.A.-C.

## 2018-06-05 ENCOUNTER — OFFICE VISIT (OUTPATIENT)
Dept: MEDICAL GROUP | Age: 53
End: 2018-06-05
Payer: COMMERCIAL

## 2018-06-05 VITALS
OXYGEN SATURATION: 97 % | DIASTOLIC BLOOD PRESSURE: 88 MMHG | WEIGHT: 176 LBS | BODY MASS INDEX: 30.05 KG/M2 | TEMPERATURE: 98.6 F | SYSTOLIC BLOOD PRESSURE: 132 MMHG | HEIGHT: 64 IN | HEART RATE: 66 BPM

## 2018-06-05 DIAGNOSIS — S92.501D: ICD-10-CM

## 2018-06-05 DIAGNOSIS — E66.9 OBESITY (BMI 30.0-34.9): ICD-10-CM

## 2018-06-05 DIAGNOSIS — E89.0 POSTSURGICAL HYPOTHYROIDISM: ICD-10-CM

## 2018-06-05 DIAGNOSIS — L73.9 ACUTE FOLLICULITIS: ICD-10-CM

## 2018-06-05 PROCEDURE — 99214 OFFICE O/P EST MOD 30 MIN: CPT | Performed by: INTERNAL MEDICINE

## 2018-06-05 NOTE — ASSESSMENT & PLAN NOTE
Patient injure her right fifth toe  by slumping with a door last week.  She has mild displaced fracture on proximal phalanx of right fifth toe which is confirmed by x-ray.  Patient was seen by [facility] orthopedist and she has a boot for her right foot.  She states that she is doing better her pain is able to control with over-the-counter ibuprofen as needed.

## 2018-06-05 NOTE — PROGRESS NOTES
"Subjective:   Kaleigh Figueroa is a 53 y.o. female here today for evaluation and management of:      Postsurgical hypothyroidism  Patient states that she has thyroid blood tests for endocrinologist 2 weeks ago.  Her thyroid hormone level is normal.  She is told to continue levothyroxine 88 mcg once a day every morning by endocrinologist.  She has thyroid biopsy done by Dr. Saleem, endocrinologist 2 weeks ago and she has follow-up appointment with her in clinic today.  Patient denies side effects from taking levothyroxine.  Patient has blood work done in [facility].  [facility]    Acute folliculitis  This is a new problem. Patient reported having a bump on right upper back started yesterday.  She scratched accidentally.  She saw lesion on her back from mirror.  She denied pain, burning sensation or itchiness.  She has not tried anything for her symptom.    Closed fracture of phalanx of right fifth toe with routine healing  Patient injure her right fifth toe  by slumping with a door last week.  She has mild displaced fracture on proximal phalanx of right fifth toe which is confirmed by x-ray.  Patient was seen by [facility] orthopedist and she has a boot for her right foot.  She states that she is doing better her pain is able to control with over-the-counter ibuprofen as needed.         Current medicines (including changes today)  Current Outpatient Prescriptions   Medication Sig Dispense Refill   • Levothyroxine Sodium (SYNTHROID PO) Take 88 mcg by mouth.     • Calcium Citrate-Vitamin D (CITRACAL + D PO) Take  by mouth.       No current facility-administered medications for this visit.      She  has a past medical history of Thyroid disease.    ROS   No chest pain, no shortness of breath, no abdominal pain       Objective:     Blood pressure 132/88, pulse 66, temperature 37 °C (98.6 °F), height 1.626 m (5' 4\"), weight 79.8 kg (176 lb), SpO2 97 %, not currently breastfeeding. Body mass " index is 30.21 kg/m².   Physical Exam:  General: Alert, oriented and no acute distress.  Eye contact is good, speech goal directed, affect calm  HEENT: conjunctiva non-injected, sclera non-icteric.  Oral mucous membranes pink and moist with no lesions.  Pinna normal.   Lungs: Normal respiratory effort, clear to auscultation bilaterally with good excursion.  CV: regular rate and rhythm. No murmurs.   Abdomen: soft, non distended, nontender, Bowel sound normal.  Ext: no edema, color normal, vascularity normal, temperature normal  Skin exam: Erythematous and inflamed follicle on right upper back.  No signs of cellulitis.      Assessment and Plan:   The following treatment plan was discussed     1. Postsurgical hypothyroidism  - Continue levothyroxine 88 mcg every morning as prescribed by endocrinologist.  Patient has a right bowel seen with endocrinologist 2 weeks ago.  She has appointment with endocrinologist today to follow-up on biopsy report.    2. Acute folliculitis  - Localized folliculitis with one lesion, no signs of cellulitis.  Will try topical Polysporin in discussed to keep warm dry and clean.    3. Closed fracture of phalanx of right fifth toe with routine healing  - Continue boost and follow with orthopedics as scheduled.     4. Obesity (BMI 30.0-34.9)  - Counseled for healthy diet and regular physical exercise to lose weight.  - Patient identified as having weight management issue.  Appropriate orders and counseling given.      Face-to-face time spent 25 minutes with patient and more than half of that time spent for counseling and cooperating of care for medical problems listed above.       Followup: Return in about 6 months (around 12/5/2018), or if symptoms worsen or fail to improve, for Hypothyroid, osteopenia, lab review.      Please note that this dictation was created using voice recognition software. I have made every reasonable attempt to correct obvious errors, but I expect that there may have  unintended errors in text, spelling, punctuation, or grammar that I did not discover.

## 2018-06-05 NOTE — ASSESSMENT & PLAN NOTE
This is a new problem. Patient reported having a bump on right upper back started yesterday.  She scratched accidentally.  She saw lesion on her back from mirror.  She denied pain, burning sensation or itchiness.  She has not tried anything for her symptom.

## 2018-06-05 NOTE — ASSESSMENT & PLAN NOTE
Patient states that she has thyroid blood tests for endocrinologist 2 weeks ago.  Her thyroid hormone level is normal.  She is told to continue levothyroxine 88 mcg once a day every morning by endocrinologist.  She has thyroid biopsy done by Dr. Saleem, endocrinologist 2 weeks ago and she has follow-up appointment with her in clinic today.  Patient denies side effects from taking levothyroxine.  [facility]  [facility]

## 2018-06-07 LAB
25(OH)D3+25(OH)D2 SERPL-MCNC: 29.3 NG/ML (ref 30–100)
ALBUMIN SERPL-MCNC: 4.3 G/DL (ref 3.5–5.5)
ALBUMIN/GLOB SERPL: 1.3 {RATIO} (ref 1.2–2.2)
ALP SERPL-CCNC: 120 IU/L (ref 39–117)
ALT SERPL-CCNC: 20 IU/L (ref 0–32)
AST SERPL-CCNC: 21 IU/L (ref 0–40)
BILIRUB SERPL-MCNC: 0.6 MG/DL (ref 0–1.2)
BUN SERPL-MCNC: 15 MG/DL (ref 6–24)
BUN/CREAT SERPL: 16 (ref 9–23)
CALCIUM SERPL-MCNC: 9.3 MG/DL (ref 8.7–10.2)
CHLORIDE SERPL-SCNC: 106 MMOL/L (ref 96–106)
CHOLEST SERPL-MCNC: 173 MG/DL (ref 100–199)
CO2 SERPL-SCNC: 25 MMOL/L (ref 18–29)
CREAT SERPL-MCNC: 0.91 MG/DL (ref 0.57–1)
GFR SERPLBLD CREATININE-BSD FMLA CKD-EPI: 72 ML/MIN/1.73
GFR SERPLBLD CREATININE-BSD FMLA CKD-EPI: 83 ML/MIN/1.73
GLOBULIN SER CALC-MCNC: 3.2 G/DL (ref 1.5–4.5)
GLUCOSE SERPL-MCNC: 91 MG/DL (ref 65–99)
HDLC SERPL-MCNC: 64 MG/DL
LABORATORY COMMENT REPORT: NORMAL
LDLC SERPL CALC-MCNC: 98 MG/DL (ref 0–99)
POTASSIUM SERPL-SCNC: 4.5 MMOL/L (ref 3.5–5.2)
PROT SERPL-MCNC: 7.5 G/DL (ref 6–8.5)
SODIUM SERPL-SCNC: 145 MMOL/L (ref 134–144)
T4 FREE SERPL-MCNC: 1.91 NG/DL (ref 0.82–1.77)
TRIGL SERPL-MCNC: 57 MG/DL (ref 0–149)
TSH SERPL DL<=0.005 MIU/L-ACNC: 0.92 UIU/ML (ref 0.45–4.5)
VLDLC SERPL CALC-MCNC: 11 MG/DL (ref 5–40)

## 2018-06-08 ENCOUNTER — TELEPHONE (OUTPATIENT)
Dept: MEDICAL GROUP | Age: 53
End: 2018-06-08

## 2018-06-08 NOTE — TELEPHONE ENCOUNTER
----- Message from Maribel Blackman M.D. sent at 6/7/2018  8:28 PM PDT -----  Please inform patient that her recent blood tests show slightly low on vitamin D at 29.3. Please recommend patient to continue to take vitamin D supplements daily.  Her thyroid hormone show slightly elevated free T4. Please advise patient follow with Dr. Saleem endocrinologist to adjust the dose of thyroid hormone.  She has slightly elevated sodium at 145. It is recommended to increase water intake and eat low salt diet.  She has slightly elevated alkaline phosphatase at 120. It can be due to her recent fracture on right fifth toe.  Other blood test results are normal.    Thanks!  Maribel Blackman M.D.

## 2018-06-08 NOTE — TELEPHONE ENCOUNTER
Phone Number Called: [phone] (home)       Message:LVM for patient to contact office. Patient needs to be informed of test results from provider.  Left Message for patient to call back: yes

## 2018-06-11 NOTE — TELEPHONE ENCOUNTER
Phone Number Called: [phone] (home)       Message: Called patient back, she left voicemail on MA line in regards to call back, returned call no one. LVM for patient to call office back.     Left Message for patient to call back: yes

## 2018-06-12 ENCOUNTER — TELEPHONE (OUTPATIENT)
Dept: MEDICAL GROUP | Age: 53
End: 2018-06-12

## 2018-06-12 NOTE — TELEPHONE ENCOUNTER
1. Caller Name: Kaleigh Figueroa                                           Call Back Number: [phone] (home)         Patient approves a detailed voicemail message: no    Pt wondering if she can come in and get her pap. She wanted me to tell you that her last pap was 2016

## 2018-06-13 NOTE — TELEPHONE ENCOUNTER
Phone Number Called: [phone] (home)       Message: LVM to patient to schedule appointment regarding the note below.    Left Message for patient to call back: yes

## 2018-06-13 NOTE — TELEPHONE ENCOUNTER
Please schedule 40 minutes appointment for Pap smear and pelvic exam for patient.    Thanks!    Maribel Blackman M.D.

## 2018-06-13 NOTE — TELEPHONE ENCOUNTER
1. Caller Name: Kaleigh Figueroa                                           Call Back Number: [phone] (home)      pt already informed

## 2018-10-15 ENCOUNTER — TELEPHONE (OUTPATIENT)
Dept: MEDICAL GROUP | Age: 53
End: 2018-10-15

## 2018-10-15 DIAGNOSIS — E87.0 HYPERNATREMIA: ICD-10-CM

## 2018-10-15 DIAGNOSIS — E89.0 POSTSURGICAL HYPOTHYROIDISM: ICD-10-CM

## 2018-10-15 DIAGNOSIS — E55.9 VITAMIN D INSUFFICIENCY: ICD-10-CM

## 2018-10-15 NOTE — TELEPHONE ENCOUNTER
1. Caller Name: Kaleigh Figueroa                                           Call Back Number: [phone] (home)         Patient approves a detailed voicemail message: no    2. SPECIFIC Action To Be Taken: Orders needed, please advise.    Appointment schedule on 12/10/2018. Patient requests labs if any are needed, please advise.

## 2018-10-16 NOTE — TELEPHONE ENCOUNTER
Phone Number Called: [phone] (home)       Message: LVM for patient letting her know of message below.    Left Message for patient to call back: no

## 2018-10-16 NOTE — TELEPHONE ENCOUNTER
Please inform patient that blood tests are ordered today.  She can do blood test 1 week before next follow-up visit on 12/10/18.  Please advise to fast for 12-hour before blood test.  She can drink water or take medication during fasting.    Maribel Blackman M.D.

## 2018-12-06 LAB
25(OH)D3+25(OH)D2 SERPL-MCNC: 22.5 NG/ML (ref 30–100)
ALBUMIN SERPL-MCNC: 4.1 G/DL (ref 3.5–5.5)
ALBUMIN/GLOB SERPL: 1.5 {RATIO} (ref 1.2–2.2)
ALP SERPL-CCNC: 117 IU/L (ref 39–117)
ALT SERPL-CCNC: 15 IU/L (ref 0–32)
AST SERPL-CCNC: 19 IU/L (ref 0–40)
BASOPHILS # BLD AUTO: 0 X10E3/UL (ref 0–0.2)
BASOPHILS NFR BLD AUTO: 1 %
BILIRUB SERPL-MCNC: 0.8 MG/DL (ref 0–1.2)
BUN SERPL-MCNC: 15 MG/DL (ref 6–24)
BUN/CREAT SERPL: 17 (ref 9–23)
CALCIUM SERPL-MCNC: 9.4 MG/DL (ref 8.7–10.2)
CHLORIDE SERPL-SCNC: 107 MMOL/L (ref 96–106)
CO2 SERPL-SCNC: 22 MMOL/L (ref 20–29)
CREAT SERPL-MCNC: 0.87 MG/DL (ref 0.57–1)
EOSINOPHIL # BLD AUTO: 0.1 X10E3/UL (ref 0–0.4)
EOSINOPHIL NFR BLD AUTO: 2 %
ERYTHROCYTE [DISTWIDTH] IN BLOOD BY AUTOMATED COUNT: 13.8 % (ref 12.3–15.4)
GLOBULIN SER CALC-MCNC: 2.7 G/DL (ref 1.5–4.5)
GLUCOSE SERPL-MCNC: 88 MG/DL (ref 65–99)
HCT VFR BLD AUTO: 43.4 % (ref 34–46.6)
HGB BLD-MCNC: 13.9 G/DL (ref 11.1–15.9)
IF AFRICAN AMERICAN  100797: 87 ML/MIN/1.73
IF NON AFRICAN AMER 100791: 76 ML/MIN/1.73
IMM GRANULOCYTES # BLD: 0 X10E3/UL (ref 0–0.1)
IMM GRANULOCYTES NFR BLD: 0 %
IMMATURE CELLS  115398: NORMAL
LYMPHOCYTES # BLD AUTO: 1.9 X10E3/UL (ref 0.7–3.1)
LYMPHOCYTES NFR BLD AUTO: 34 %
MCH RBC QN AUTO: 28.3 PG (ref 26.6–33)
MCHC RBC AUTO-ENTMCNC: 32 G/DL (ref 31.5–35.7)
MCV RBC AUTO: 88 FL (ref 79–97)
MONOCYTES # BLD AUTO: 0.4 X10E3/UL (ref 0.1–0.9)
MONOCYTES NFR BLD AUTO: 8 %
MORPHOLOGY BLD-IMP: NORMAL
NEUTROPHILS # BLD AUTO: 3.1 X10E3/UL (ref 1.4–7)
NEUTROPHILS NFR BLD AUTO: 55 %
NRBC BLD AUTO-RTO: NORMAL %
PLATELET # BLD AUTO: 276 X10E3/UL (ref 150–379)
POTASSIUM SERPL-SCNC: 4.4 MMOL/L (ref 3.5–5.2)
PROT SERPL-MCNC: 6.8 G/DL (ref 6–8.5)
RBC # BLD AUTO: 4.92 X10E6/UL (ref 3.77–5.28)
SODIUM SERPL-SCNC: 144 MMOL/L (ref 134–144)
T4 FREE SERPL-MCNC: 2.19 NG/DL (ref 0.82–1.77)
TSH SERPL DL<=0.005 MIU/L-ACNC: 0.57 UIU/ML (ref 0.45–4.5)
WBC # BLD AUTO: 5.6 X10E3/UL (ref 3.4–10.8)

## 2018-12-10 ENCOUNTER — OFFICE VISIT (OUTPATIENT)
Dept: MEDICAL GROUP | Age: 53
End: 2018-12-10
Payer: COMMERCIAL

## 2018-12-10 VITALS
TEMPERATURE: 99.6 F | HEIGHT: 64 IN | BODY MASS INDEX: 29.88 KG/M2 | HEART RATE: 69 BPM | SYSTOLIC BLOOD PRESSURE: 138 MMHG | OXYGEN SATURATION: 94 % | DIASTOLIC BLOOD PRESSURE: 86 MMHG | WEIGHT: 175 LBS

## 2018-12-10 DIAGNOSIS — M85.80 OSTEOPENIA, UNSPECIFIED LOCATION: ICD-10-CM

## 2018-12-10 DIAGNOSIS — E89.0 POSTSURGICAL HYPOTHYROIDISM: ICD-10-CM

## 2018-12-10 DIAGNOSIS — Z23 NEED FOR VACCINATION: ICD-10-CM

## 2018-12-10 DIAGNOSIS — E55.9 VITAMIN D INSUFFICIENCY: ICD-10-CM

## 2018-12-10 DIAGNOSIS — Z78.0 POSTMENOPAUSAL ESTROGEN DEFICIENCY: ICD-10-CM

## 2018-12-10 PROCEDURE — 90471 IMMUNIZATION ADMIN: CPT | Performed by: INTERNAL MEDICINE

## 2018-12-10 PROCEDURE — 99214 OFFICE O/P EST MOD 30 MIN: CPT | Mod: 25 | Performed by: INTERNAL MEDICINE

## 2018-12-10 PROCEDURE — 90686 IIV4 VACC NO PRSV 0.5 ML IM: CPT | Performed by: INTERNAL MEDICINE

## 2018-12-10 RX ORDER — ERGOCALCIFEROL 1.25 MG/1
50000 CAPSULE ORAL
Qty: 12 CAP | Refills: 3 | Status: SHIPPED | OUTPATIENT
Start: 2018-12-10 | End: 2023-02-24

## 2018-12-10 ASSESSMENT — PATIENT HEALTH QUESTIONNAIRE - PHQ9
5. POOR APPETITE OR OVEREATING: 0 - NOT AT ALL
CLINICAL INTERPRETATION OF PHQ2 SCORE: 1

## 2018-12-10 NOTE — PROGRESS NOTES
"Subjective:   Kaleigh Figueroa is a 53 y.o. female here today for evaluation and management of:      Vitamin D insufficiency  Patient stated that she did not remember to take her vitamin D supplements regularly.  Her vitamin D level decreased to 22.5 on 12/5/18.  Patient wanted to take prescription vitamin D once a week for easy dosing.  I reviewed the risks and benefits of ergocalciferol with patient and prescribed ergocalciferol 50,000 units once a week.    Postsurgical hypothyroidism  Patient is taking levothyroxine 88 mcg every morning on empty stomach.  She states that she has appointment with Dr. Saleem, endocrinologist for medication management and follow-up on thyroid ultrasound.  She denied side effects from taking levothyroxine and denied signs and symptoms of hypo-or hyperthyroidism.    Osteopenia  Patient has osteopenia with high risk fracture.  Last DEXA scan was done on 1/31/17.  She was taking calcium and vitamin D intermittently as she forgot to take it daily.         Current medicines (including changes today)  Current Outpatient Prescriptions   Medication Sig Dispense Refill   • ergocalciferol (DRISDOL) 85855 UNIT capsule Take 1 Cap by mouth every 7 days. 12 Cap 3   • Calcium Citrate-Vitamin D (CITRACAL + D PO) Take  by mouth.     • Levothyroxine Sodium (SYNTHROID PO) Take 88 mcg by mouth.       No current facility-administered medications for this visit.      She  has a past medical history of Thyroid disease.    ROS   No chest pain, no shortness of breath, no abdominal pain       Objective:     Blood pressure 138/86, pulse 69, temperature 37.6 °C (99.6 °F), temperature source Temporal, height 1.626 m (5' 4\"), weight 79.4 kg (175 lb), last menstrual period 02/01/2016, SpO2 94 %, not currently breastfeeding. Body mass index is 30.04 kg/m².   Physical Exam:  General: Alert, oriented and no acute distress.  Eye contact is good, speech goal directed, affect calm  HEENT: conjunctiva non-injected, " sclera non-icteric.  Oral mucous membranes pink and moist with no lesions.  Pinna normal.  Lungs: Normal respiratory effort, clear to auscultation bilaterally with good excursion.  CV: regular rate and rhythm. No murmurs.   Abdomen: soft, non distended, nontender, Bowel sound normal.  Ext: no edema, color normal, vascularity normal, temperature normal      Assessment and Plan:   The following treatment plan was discussed     1. Vitamin D insufficiency  - Not well controlled.  Prescribed ergocalciferol 50,000 units to take 1 capsule once a week.  Reviewed potential side effects of ergocalciferol with patient.  Recheck lab in 6 months.  - ergocalciferol (DRISDOL) 66719 UNIT capsule; Take 1 Cap by mouth every 7 days.  Dispense: 12 Cap; Refill: 3  - VITAMIN D,25 HYDROXY; Future    2. Postsurgical hypothyroidism  - Her recent TSH is in low normal and slightly elevated free T4 at 2.19 in recent blood tests on 12/5/18.  I reviewed blood test result with her in clinic today.  She is taking levothyroxine 88 mcg every morning on empty stomach.  She states that she has follow-up appointment with her endocrinologist for medication management and thyroid ultrasound follow-up.  She will continue current regimens until she discussed with her endocrinologist.  - TSH; Future  - FREE THYROXINE; Future  - Lipid Profile; Future    3. Osteopenia, unspecified location  - Advised patient to do weightbearing exercise regularly and takes vitamin D and calcium supplement daily.  Repeat DEXA scan in February 2019.  - COMP METABOLIC PANEL; Future  - VITAMIN D,25 HYDROXY; Future  - DS-BONE DENSITY STUDY (DEXA); Future    4. Postmenopausal estrogen deficiency  - Ordered DEXA scan to follow-up on bone density.  She will schedule to have DEXA scan in February 2019.  - COMP METABOLIC PANEL; Future  - DS-BONE DENSITY STUDY (DEXA); Future    5. Need for vaccination  - Influenza vaccine was given today after reviewing risks and benefits as well as  side effects of vaccine.  - Influenza Vaccine Quad Injection >3Y (PF)        Followup: Return in about 6 months (around 6/10/2019), or if symptoms worsen or fail to improve, for Vitamin D insufficiency, Osteopenia, Hypothyroid, Lab review.      Please note that this dictation was created using voice recognition software. I have made every reasonable attempt to correct obvious errors, but I expect that there may have unintended errors in text, spelling, punctuation, or grammar that I did not discover.

## 2018-12-10 NOTE — ASSESSMENT & PLAN NOTE
Patient has osteopenia with high risk fracture.  Last DEXA scan was done on 1/31/17.  She was taking calcium and vitamin D intermittently as she forgot to take it daily.

## 2018-12-10 NOTE — ASSESSMENT & PLAN NOTE
Patient is taking levothyroxine 88 mcg every morning on empty stomach.  She states that she has appointment with Dr. Saleem, endocrinologist for medication management and follow-up on thyroid ultrasound.  She denied side effects from taking levothyroxine and denied signs and symptoms of hypo-or hyperthyroidism.

## 2018-12-10 NOTE — ASSESSMENT & PLAN NOTE
Patient stated that she did not remember to take her vitamin D supplements regularly.  Her vitamin D level decreased to 22.5 on 12/5/18.  Patient wanted to take prescription vitamin D once a week for easy dosing.  I reviewed the risks and benefits of ergocalciferol with patient and prescribed ergocalciferol 50,000 units once a week.

## 2019-03-14 ENCOUNTER — APPOINTMENT (OUTPATIENT)
Dept: MEDICAL GROUP | Age: 54
End: 2019-03-14

## 2019-03-18 ENCOUNTER — TELEPHONE (OUTPATIENT)
Dept: MEDICAL GROUP | Age: 54
End: 2019-03-18

## 2019-03-18 NOTE — TELEPHONE ENCOUNTER
1. Caller Name: Kaleigh Figueroa                                           Call Back Number: [phone] (home)         Patient approves a detailed voicemail message: N\A    Pt cancelled her recent appointment because she has lost insurance coverage.  The self-pay system was explained to Pt, but she would like to wait to do her follow up visit when she has coverage again.      Pt is wondering if she needs to complete her mammogram and dexa scan ASAP, or if she can wait until she has coverage again.    Please advise

## 2019-03-18 NOTE — TELEPHONE ENCOUNTER
Please inform patient that her mammogram is not due until 12/5/2019.  I will recommend her to do mammogram on 12/5/2019.  She can wait for DEXA scan until she has a new health insurance.    Maribel Blackman M.D.

## 2019-07-11 ENCOUNTER — OFFICE VISIT (OUTPATIENT)
Dept: MEDICAL GROUP | Age: 54
End: 2019-07-11

## 2019-07-11 VITALS
DIASTOLIC BLOOD PRESSURE: 88 MMHG | BODY MASS INDEX: 29.94 KG/M2 | HEART RATE: 62 BPM | SYSTOLIC BLOOD PRESSURE: 130 MMHG | HEIGHT: 64 IN | OXYGEN SATURATION: 96 % | WEIGHT: 175.4 LBS | TEMPERATURE: 99.3 F

## 2019-07-11 DIAGNOSIS — L08.9 WOUND INFECTION: ICD-10-CM

## 2019-07-11 DIAGNOSIS — T14.8XXA WOUND INFECTION: ICD-10-CM

## 2019-07-11 DIAGNOSIS — F43.21 ADJUSTMENT DISORDER WITH DEPRESSED MOOD: ICD-10-CM

## 2019-07-11 DIAGNOSIS — E66.9 OBESITY (BMI 30.0-34.9): ICD-10-CM

## 2019-07-11 PROCEDURE — 99214 OFFICE O/P EST MOD 30 MIN: CPT | Performed by: INTERNAL MEDICINE

## 2019-07-11 RX ORDER — DOXYCYCLINE HYCLATE 100 MG
100 TABLET ORAL 2 TIMES DAILY
Qty: 14 TAB | Refills: 0 | Status: SHIPPED | OUTPATIENT
Start: 2019-07-11 | End: 2019-07-18

## 2019-07-11 ASSESSMENT — PAIN SCALES - GENERAL: PAINLEVEL: 4=SLIGHT-MODERATE PAIN

## 2019-07-11 ASSESSMENT — PATIENT HEALTH QUESTIONNAIRE - PHQ9
5. POOR APPETITE OR OVEREATING: 1 - SEVERAL DAYS
CLINICAL INTERPRETATION OF PHQ2 SCORE: 1
SUM OF ALL RESPONSES TO PHQ QUESTIONS 1-9: 10

## 2019-07-11 NOTE — PROGRESS NOTES
Subjective:   Kaleigh Figueroa is a 54 y.o. female here today for evaluation and management of:    Wound infection   New acute complaint. Patient is here for evaluation of an injury and possible wound infection to the 5th digit on her left hand onset a few weeks ago. She states she was hanging up a banner in early June when the pair of scissors she was holding in her hand slipped and cut her left pinky. Once she controlled the bleeding she cleaned her wound, treated it with Neosporin, and bandaged it. Her finger was improving with home regimens however over the past week she noticed her pinky was starting to swell with associated pain.  Pain increased with moving her pinky.    Adjustment disorder with depressed mood  Patient states that she has been increasingly weepy and with sad mood since her son has recently graduated, she is going through a divorce, she is currently uninsured, recent pet loss, and other life stress. She does not feel she requires anti-depressants currently. Patient notes good support from her Women's group that she sees regularly. She feels she has a good support system and she is happy with daily activities. She also states that she is trying alternatives to manage her mood such as exercise and grief counseling which she has been doing since her divorce. She denies ever being diagnosed with depression or anxiety. Denies suicidal ideation or plan or homicidal ideation or plan.     Obesity (BMI 30.0-34.9)  Patient states that she is exercising regularly and trying to manage her weight. Patient states that she is a stress eater. She enjoys eating carbohydrates such as macaroni & cheese and eats a salty diet.      Current medicines (including changes today)  Current Outpatient Prescriptions   Medication Sig Dispense Refill   • doxycycline (VIBRAMYCIN) 100 MG Tab Take 1 Tab by mouth 2 times a day for 7 days. 14 Tab 0   • ergocalciferol (DRISDOL) 73075 UNIT capsule Take 1 Cap by mouth every 7  "days. 12 Cap 3   • Calcium Citrate-Vitamin D (CITRACAL + D PO) Take  by mouth.     • Levothyroxine Sodium (SYNTHROID PO) Take 88 mcg by mouth.       No current facility-administered medications for this visit.      She  has a past medical history of Thyroid disease.    ROS   No chest pain, no shortness of breath, no abdominal pain       Objective:     /88 (BP Location: Right arm, Patient Position: Sitting, BP Cuff Size: Adult)   Pulse 62   Temp 37.4 °C (99.3 °F) (Temporal)   Ht 1.626 m (5' 4\")   Wt 79.6 kg (175 lb 6.4 oz)   SpO2 96%  Body mass index is 30.11 kg/m².     Physical Exam:  General: Alert, oriented and no acute distress.  Eye contact is good, speech goal directed, affect calm  HEENT: conjunctiva non-injected, sclera non-icteric.  Oral mucous membranes pink and moist with no lesions.  Pinna normal.   Lungs: Normal respiratory effort, clear to auscultation bilaterally with good excursion.  CV: regular rate and rhythm. No murmurs.   Abdomen: soft, non distended, nontender, Bowel sound normal.  Ext: no edema, color normal, vascularity normal, temperature normal  Musculoskeletal: Old scar on the left 5th finger with erythema and swelling on surrounding skin.    Assessment and Plan:   The following treatment plan was discussed     1. Wound infection  - Discussed treatment care plan for wound infection with the patient including Doxycycline antibiotics 100 mg for 7 days. She agrees with this plan.  - Discussed the medication side effects and risks and benefits of Doxycycline with the patient. She will stop medication if she experiences side effects.  - Patient advised to continue treating with Neosporin while taking antibiotics. Counseled on good hand hygiene.  Advised to keep hand dry and clean and avoid hand soaking with water  - doxycycline (VIBRAMYCIN) 100 MG Tab; Take 1 Tab by mouth 2 times a day for 7 days.  Dispense: 14 Tab; Refill: 0    2. Adjustment disorder with depressed mood  - Patient has " been identified as having a positive depression screening with PHQ 9 score at 10. Appropriate orders and counseling have been given.  - Discussed treating with anti-depressants. Patient refuses medication currently and feel her mood is stable with the alternatives she is trying. Advised patient to continue to exercise.  - Continue seeing her Women's group and Grief Counseling regularly or as needed.  - Patient is advised to seek urgent medical attention if her symptoms do not improve.    3. Obesity (BMI 30.0-34.9)  - Patient identified as having weight management issue. Appropriate orders and counseling given.  - Patient advised against processed sugars and to eat a low fat, low carbohydrate and high fiber diet as well as do cardio physical exercise regularly  - Counseled on a low sodium diet.   - Continue manage weight and diet.     4. Health Maintenance   - Last pap smear was 2016. She is due for repeat pap smear this year.  She will follow with her gynecologist [facility] for Pap smear in future.      Followup: Return in about 8 weeks (around 9/5/2019), or if symptoms worsen or fail to improve, for Adjustment disorder with depressed mood, hypothyroid,, Vitamin D insufficiency, Lab review.      Please note that this dictation was created using voice recognition software. I have made every reasonable attempt to correct obvious errors, but I expect that there may have unintended errors in text, spelling, punctuation, or grammar that I did not discover.    I, Africa Mims (Elle), am scribing for, and in the presence of, Maribel Blackmna M.D..    Electronically signed by: Africa Mims (Elle), 7/11/2019    I, Maribel Blackman M.D., personally performed the services described in this documentation, as scribed by Africa Mims in my presence, and it is both accurate and complete.

## 2019-10-02 ENCOUNTER — APPOINTMENT (OUTPATIENT)
Dept: MEDICAL GROUP | Age: 54
End: 2019-10-02

## 2019-10-08 ENCOUNTER — APPOINTMENT (OUTPATIENT)
Dept: MEDICAL GROUP | Age: 54
End: 2019-10-08

## 2019-10-10 ENCOUNTER — OFFICE VISIT (OUTPATIENT)
Dept: MEDICAL GROUP | Age: 54
End: 2019-10-10

## 2019-10-10 VITALS — WEIGHT: 176.6 LBS | HEIGHT: 64 IN | BODY MASS INDEX: 30.15 KG/M2

## 2019-10-10 DIAGNOSIS — Z23 NEED FOR VACCINATION: ICD-10-CM
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
## 2019-10-10 DIAGNOSIS — E55.9 VITAMIN D INSUFFICIENCY: ICD-10-CM

## 2019-10-10 DIAGNOSIS — E89.0 POSTSURGICAL HYPOTHYROIDISM: ICD-10-CM

## 2019-10-10 PROCEDURE — 99214 OFFICE O/P EST MOD 30 MIN: CPT | Mod: 25 | Performed by: INTERNAL MEDICINE

## 2019-10-10 PROCEDURE — 90471 IMMUNIZATION ADMIN: CPT | Performed by: INTERNAL MEDICINE

## 2019-10-10 PROCEDURE — 90686 IIV4 VACC NO PRSV 0.5 ML IM: CPT | Performed by: INTERNAL MEDICINE

## 2019-10-10 RX ORDER — AMOXICILLIN AND CLAVULANATE POTASSIUM 875; 125 MG/1; MG/1
1 TABLET, FILM COATED ORAL 2 TIMES DAILY
Qty: 20 TAB | Refills: 0 | Status: SHIPPED | OUTPATIENT
Start: 2019-10-10 | End: 2019-10-20

## 2019-10-10 SDOH — HEALTH STABILITY: MENTAL HEALTH: HOW OFTEN DO YOU HAVE A DRINK CONTAINING ALCOHOL?: MONTHLY OR LESS

## 2019-10-10 SDOH — HEALTH STABILITY: MENTAL HEALTH: HOW OFTEN DO YOU HAVE 6 OR MORE DRINKS ON ONE OCCASION?: NEVER

## 2019-10-10 SDOH — HEALTH STABILITY: MENTAL HEALTH: HOW MANY STANDARD DRINKS CONTAINING ALCOHOL DO YOU HAVE ON A TYPICAL DAY?: 1 OR 2

## 2019-10-10 NOTE — PROGRESS NOTES
Subjective:   Kaleigh Figueroa is a 54 y.o. female here today for evaluation and management of:    Postsurgical hypothyroidism  Patient states that she is taking Synthroid 88 mcg every morning as directed by endocrinologist. Denies palpitations, skin changes, temperature intolerance, changes in bowel habits. She had labs done at [facility]. Her TSH was 0.71 and Free T4 was 2.13 in August 2019.  Last thyroid function test showed that she has low normal TSH and high free T4.  Patient has not followed with her endocrinologist after the blood test due to lack of health insurance.  I discussed with patient to decrease the dose of Synthroid to 6 days a week and recheck thyroid function test in 6 to 8 weeks before follow-up.  She agrees with the plan.    Vitamin D insufficiency  Chronic in nature and stable. Patient takes 79589 IU's once a week and Citracal + D of OTC Vitamin D supplementation.  The blood test was ordered for vitamin D follow-up in previous visit.  However, patient has not completed the blood test yet.    Dog bite, initial encounter  The patient was bit on her right hand by a friend's dog who she was watching.  The events occurred on last Sunday. She has been applying neosporin and the wound is healing well.  Patient reported that the dog was up to date on all immunization.  Patient received Tdap vaccine on 12/11/2015.  She denied fever or chills.  She has mild pain on the wound.      Current medicines (including changes today)  Current Outpatient Medications   Medication Sig Dispense Refill   • amoxicillin-clavulanate (AUGMENTIN) 875-125 MG Tab Take 1 Tab by mouth 2 times a day for 10 days. 20 Tab 0   • ergocalciferol (DRISDOL) 27048 UNIT capsule Take 1 Cap by mouth every 7 days. 12 Cap 3   • Calcium Citrate-Vitamin D (CITRACAL + D PO) Take  by mouth.     • Levothyroxine Sodium (SYNTHROID PO) Take 88 mcg by mouth.       No current facility-administered medications for this visit.      She  has a past  "medical history of Thyroid disease.    ROS   No chest pain, no shortness of breath, no abdominal pain       Objective:     Ht 1.634 m (5' 4.33\")   Wt 80.1 kg (176 lb 9.6 oz)  Body mass index is 30 kg/m².   Physical Exam:  General: Alert, oriented and no acute distress.  Eye contact is good, speech goal directed, affect calm  HEENT: conjunctiva non-injected, sclera non-icteric.  Oral mucous membranes pink and moist with no lesions.  Pinna normal.   Lungs: Normal respiratory effort, clear to auscultation bilaterally with good excursion.  CV: regular rate and rhythm. No murmurs.   Abdomen: soft, non distended, nontender, Bowel sound normal.  Ext: no edema, color normal, vascularity normal, temperature normal  Musculoskeletal exam: Abrasion wound on right hand with erythema and mild swelling on overlying skin.    Assessment and Plan:   The following treatment plan was discussed     1. Postsurgical hypothyroidism  - Decrease the dose of the Synthroid 88 mcg from 7 days a week to 6 days a week because her T4 is elevated at 2.13.  Patient is advised to take Synthroid in the morning on empty stomach.  Recheck lab in 6 to 8 weeks before next follow-up visit.  - Continue to follow up with Dr. Saleem, endocrinologist.  - CBC WITH DIFFERENTIAL; Future  - Comp Metabolic Panel; Future  - TSH; Future  - FREE THYROXINE; Future    2. Vitamin D insufficiency  - Patient's Vitamin D level is stable. Advised to continue ergocalciferol 50,000 IU's weekly.    - VITAMIN D,25 HYDROXY; Future    3. Dog bite, initial encounter  - Prescribed Augmentin to take 1 tablet twice a day for 10 days.  I reviewed potential side effects of Augmentin with patient.  Antibiotic can cause diarrhea or loose stool so she should take a probiotic with the antibiotic.  - Patient has up-to-date Tdap vaccine.  Patient reported that the dog has up-to-date immunization.  She is advised to monitor the symptoms of the rabies on her friend's dog.  She believes that " her friend's dog does not have any rabies. She is advised to seek urgent medical attention if she has any worsening symptoms.  She agrees with the plan.  - amoxicillin-clavulanate (AUGMENTIN) 875-125 MG Tab; Take 1 Tab by mouth 2 times a day for 10 days.  Dispense: 20 Tab; Refill: 0  - CBC WITH DIFFERENTIAL; Future  - Comp Metabolic Panel; Future    4. Need for vaccination  - Influenza vaccine was given today after reviewing risks and benefits as well as side effects of vaccine.   - Influenza Vaccine Quad Injection (PF)       Followup: Return in about 8 weeks (around 12/5/2019), or if symptoms worsen or fail to improve, for Hypothyroid, Vitamin D insufficiency, Lab review.      Please note that this dictation was created using voice recognition software. I have made every reasonable attempt to correct obvious errors, but I expect that there may have unintended errors in text, spelling, punctuation, or grammar that I did not discover.    I, Marcia Pedroza (Robertibtara), am scribing for, and in the presence of, Maribel Blackman M.D.. Electronically signed by: Macria Pedroza (Elle), 10/10/19.    I, Maribel Blackman M.D., personally performed the services described in this documentation, as scribed by Marcia Pedroza in my presence, and it is both accurate and complete.

## 2019-12-03 ENCOUNTER — HOSPITAL ENCOUNTER (OUTPATIENT)
Dept: LAB | Facility: MEDICAL CENTER | Age: 54
End: 2019-12-03
Attending: INTERNAL MEDICINE

## 2019-12-03 LAB
T4 FREE SERPL-MCNC: 1.59 NG/DL (ref 0.53–1.43)
TSH SERPL DL<=0.005 MIU/L-ACNC: 0.34 UIU/ML (ref 0.38–5.33)

## 2019-12-03 PROCEDURE — 36415 COLL VENOUS BLD VENIPUNCTURE: CPT

## 2019-12-03 PROCEDURE — 84439 ASSAY OF FREE THYROXINE: CPT

## 2019-12-03 PROCEDURE — 84443 ASSAY THYROID STIM HORMONE: CPT

## 2020-10-05 ENCOUNTER — HOSPITAL ENCOUNTER (OUTPATIENT)
Dept: LAB | Facility: MEDICAL CENTER | Age: 55
End: 2020-10-05
Attending: INTERNAL MEDICINE

## 2021-03-15 DIAGNOSIS — Z23 NEED FOR VACCINATION: ICD-10-CM

## 2022-05-04 ENCOUNTER — APPOINTMENT (RX ONLY)
Dept: URBAN - METROPOLITAN AREA CLINIC 6 | Facility: CLINIC | Age: 57
Setting detail: DERMATOLOGY
End: 2022-05-04

## 2022-05-04 DIAGNOSIS — L82.1 OTHER SEBORRHEIC KERATOSIS: ICD-10-CM

## 2022-05-04 DIAGNOSIS — L85.3 XEROSIS CUTIS: ICD-10-CM

## 2022-05-04 DIAGNOSIS — D22 MELANOCYTIC NEVI: ICD-10-CM

## 2022-05-04 DIAGNOSIS — L57.0 ACTINIC KERATOSIS: ICD-10-CM

## 2022-05-04 DIAGNOSIS — Z71.89 OTHER SPECIFIED COUNSELING: ICD-10-CM

## 2022-05-04 DIAGNOSIS — D18.0 HEMANGIOMA: ICD-10-CM

## 2022-05-04 DIAGNOSIS — L84 CORNS AND CALLOSITIES: ICD-10-CM

## 2022-05-04 DIAGNOSIS — L21.8 OTHER SEBORRHEIC DERMATITIS: ICD-10-CM

## 2022-05-04 DIAGNOSIS — L81.4 OTHER MELANIN HYPERPIGMENTATION: ICD-10-CM

## 2022-05-04 PROBLEM — D18.01 HEMANGIOMA OF SKIN AND SUBCUTANEOUS TISSUE: Status: ACTIVE | Noted: 2022-05-04

## 2022-05-04 PROBLEM — D22.5 MELANOCYTIC NEVI OF TRUNK: Status: ACTIVE | Noted: 2022-05-04

## 2022-05-04 PROCEDURE — ? PARING HYPERKERATOTIC LESION

## 2022-05-04 PROCEDURE — ? PRESCRIPTION

## 2022-05-04 PROCEDURE — ? LIQUID NITROGEN

## 2022-05-04 PROCEDURE — ? SUNSCREEN RECOMMENDATIONS

## 2022-05-04 PROCEDURE — 17003 DESTRUCT PREMALG LES 2-14: CPT

## 2022-05-04 PROCEDURE — 99203 OFFICE O/P NEW LOW 30 MIN: CPT | Mod: 25

## 2022-05-04 PROCEDURE — 11055 PARING/CUTG B9 HYPRKER LES 1: CPT | Mod: 59

## 2022-05-04 PROCEDURE — ? PRESCRIPTION MEDICATION MANAGEMENT

## 2022-05-04 PROCEDURE — 17000 DESTRUCT PREMALG LESION: CPT

## 2022-05-04 PROCEDURE — ? COUNSELING

## 2022-05-04 RX ORDER — KETOCONAZOLE 20 MG/ML
SHAMPOO, SUSPENSION TOPICAL
Qty: 120 | Refills: 11 | Status: ERX | COMMUNITY
Start: 2022-05-04

## 2022-05-04 RX ADMIN — KETOCONAZOLE: 20 SHAMPOO, SUSPENSION TOPICAL at 00:00

## 2022-05-04 ASSESSMENT — LOCATION DETAILED DESCRIPTION DERM
LOCATION DETAILED: LOWER STERNUM
LOCATION DETAILED: LEFT RADIAL DORSAL HAND
LOCATION DETAILED: RIGHT CLAVICULAR SKIN
LOCATION DETAILED: LEFT MEDIAL MALAR CHEEK
LOCATION DETAILED: RIGHT SUPERIOR MEDIAL MIDBACK
LOCATION DETAILED: SUPERIOR THORACIC SPINE
LOCATION DETAILED: RIGHT ULNAR DORSAL HAND
LOCATION DETAILED: LEFT NASAL DORSUM
LOCATION DETAILED: SUBXIPHOID
LOCATION DETAILED: RIGHT LATERAL UPPER BACK
LOCATION DETAILED: LEFT SUPERIOR PARIETAL SCALP
LOCATION DETAILED: RIGHT SUPERIOR UPPER BACK
LOCATION DETAILED: LEFT MEDIAL 5TH TOE
LOCATION DETAILED: NASAL ROOT
LOCATION DETAILED: LEFT ANTERIOR SHOULDER

## 2022-05-04 ASSESSMENT — LOCATION SIMPLE DESCRIPTION DERM
LOCATION SIMPLE: SCALP
LOCATION SIMPLE: RIGHT CLAVICULAR SKIN
LOCATION SIMPLE: ABDOMEN
LOCATION SIMPLE: RIGHT UPPER BACK
LOCATION SIMPLE: LEFT 5TH TOE
LOCATION SIMPLE: RIGHT LOWER BACK
LOCATION SIMPLE: CHEST
LOCATION SIMPLE: UPPER BACK
LOCATION SIMPLE: RIGHT HAND
LOCATION SIMPLE: LEFT HAND
LOCATION SIMPLE: LEFT SHOULDER
LOCATION SIMPLE: LEFT CHEEK
LOCATION SIMPLE: NOSE

## 2022-05-04 ASSESSMENT — LOCATION ZONE DERM
LOCATION ZONE: HAND
LOCATION ZONE: NOSE
LOCATION ZONE: TOE
LOCATION ZONE: TRUNK
LOCATION ZONE: FACE
LOCATION ZONE: SCALP
LOCATION ZONE: ARM

## 2022-05-04 NOTE — PROCEDURE: LIQUID NITROGEN
Show Aperture Variable?: Yes
Consent: The patient's consent was obtained including but not limited to risks of crusting, scabbing, blistering, scarring, darker or lighter pigmentary change, recurrence, incomplete removal and infection.
Number Of Freeze-Thaw Cycles: 2 freeze-thaw cycles
Render Post-Care Instructions In Note?: no
Post-Care Instructions: I reviewed with the patient in detail post-care instructions. Patient is to wear sunprotection, and avoid picking at any of the treated lesions. Pt may apply Vaseline to crusted or scabbing areas.
Duration Of Freeze Thaw-Cycle (Seconds): 8
Detail Level: Zone

## 2022-05-04 NOTE — PROCEDURE: PRESCRIPTION MEDICATION MANAGEMENT
Render In Strict Bullet Format?: No
Detail Level: Zone
Initiate Treatment: Ketoconazole 2% shampoo 2-3 times weekly, continue once weekly as maintenance.

## 2022-07-25 ENCOUNTER — HOSPITAL ENCOUNTER (OUTPATIENT)
Dept: LAB | Facility: MEDICAL CENTER | Age: 57
End: 2022-07-25
Attending: INTERNAL MEDICINE
Payer: COMMERCIAL

## 2022-07-25 PROCEDURE — 84443 ASSAY THYROID STIM HORMONE: CPT

## 2022-07-25 PROCEDURE — 84439 ASSAY OF FREE THYROXINE: CPT

## 2022-07-25 PROCEDURE — 36415 COLL VENOUS BLD VENIPUNCTURE: CPT

## 2022-07-26 LAB
T4 FREE SERPL-MCNC: 1.93 NG/DL (ref 0.93–1.7)
TSH SERPL DL<=0.005 MIU/L-ACNC: 0.59 UIU/ML (ref 0.38–5.33)

## 2023-01-10 ENCOUNTER — HOSPITAL ENCOUNTER (OUTPATIENT)
Dept: LAB | Facility: MEDICAL CENTER | Age: 58
End: 2023-01-10
Attending: STUDENT IN AN ORGANIZED HEALTH CARE EDUCATION/TRAINING PROGRAM
Payer: COMMERCIAL

## 2023-01-10 LAB
ALBUMIN SERPL BCP-MCNC: 4.5 G/DL (ref 3.2–4.9)
ALBUMIN/GLOB SERPL: 1.4 G/DL
ALP SERPL-CCNC: 147 U/L (ref 30–99)
ALT SERPL-CCNC: 20 U/L (ref 2–50)
ANION GAP SERPL CALC-SCNC: 12 MMOL/L (ref 7–16)
AST SERPL-CCNC: 18 U/L (ref 12–45)
BASOPHILS # BLD AUTO: 0.6 % (ref 0–1.8)
BASOPHILS # BLD: 0.05 K/UL (ref 0–0.12)
BILIRUB SERPL-MCNC: 0.6 MG/DL (ref 0.1–1.5)
BUN SERPL-MCNC: 15 MG/DL (ref 8–22)
CALCIUM ALBUM COR SERPL-MCNC: 9.1 MG/DL (ref 8.5–10.5)
CALCIUM SERPL-MCNC: 9.5 MG/DL (ref 8.5–10.5)
CHLORIDE SERPL-SCNC: 105 MMOL/L (ref 96–112)
CHOLEST SERPL-MCNC: 190 MG/DL (ref 100–199)
CO2 SERPL-SCNC: 25 MMOL/L (ref 20–33)
CREAT SERPL-MCNC: 0.69 MG/DL (ref 0.5–1.4)
EOSINOPHIL # BLD AUTO: 0.23 K/UL (ref 0–0.51)
EOSINOPHIL NFR BLD: 2.7 % (ref 0–6.9)
ERYTHROCYTE [DISTWIDTH] IN BLOOD BY AUTOMATED COUNT: 46.8 FL (ref 35.9–50)
EST. AVERAGE GLUCOSE BLD GHB EST-MCNC: 111 MG/DL
FASTING STATUS PATIENT QL REPORTED: NORMAL
GFR SERPLBLD CREATININE-BSD FMLA CKD-EPI: 100 ML/MIN/1.73 M 2
GLOBULIN SER CALC-MCNC: 3.3 G/DL (ref 1.9–3.5)
GLUCOSE SERPL-MCNC: 86 MG/DL (ref 65–99)
HBA1C MFR BLD: 5.5 % (ref 4–5.6)
HCT VFR BLD AUTO: 44.8 % (ref 37–47)
HCV AB SER QL: NORMAL
HDLC SERPL-MCNC: 64 MG/DL
HGB BLD-MCNC: 14.3 G/DL (ref 12–16)
IMM GRANULOCYTES # BLD AUTO: 0.02 K/UL (ref 0–0.11)
IMM GRANULOCYTES NFR BLD AUTO: 0.2 % (ref 0–0.9)
LDLC SERPL CALC-MCNC: 116 MG/DL
LYMPHOCYTES # BLD AUTO: 2.44 K/UL (ref 1–4.8)
LYMPHOCYTES NFR BLD: 28.2 % (ref 22–41)
MCH RBC QN AUTO: 28.7 PG (ref 27–33)
MCHC RBC AUTO-ENTMCNC: 31.9 G/DL (ref 33.6–35)
MCV RBC AUTO: 89.8 FL (ref 81.4–97.8)
MONOCYTES # BLD AUTO: 0.47 K/UL (ref 0–0.85)
MONOCYTES NFR BLD AUTO: 5.4 % (ref 0–13.4)
NEUTROPHILS # BLD AUTO: 5.43 K/UL (ref 2–7.15)
NEUTROPHILS NFR BLD: 62.9 % (ref 44–72)
NRBC # BLD AUTO: 0 K/UL
NRBC BLD-RTO: 0 /100 WBC
PLATELET # BLD AUTO: 295 K/UL (ref 164–446)
PMV BLD AUTO: 11 FL (ref 9–12.9)
POTASSIUM SERPL-SCNC: 4.2 MMOL/L (ref 3.6–5.5)
PROT SERPL-MCNC: 7.8 G/DL (ref 6–8.2)
RBC # BLD AUTO: 4.99 M/UL (ref 4.2–5.4)
SODIUM SERPL-SCNC: 142 MMOL/L (ref 135–145)
TRIGL SERPL-MCNC: 50 MG/DL (ref 0–149)
WBC # BLD AUTO: 8.6 K/UL (ref 4.8–10.8)

## 2023-01-10 PROCEDURE — 85025 COMPLETE CBC W/AUTO DIFF WBC: CPT

## 2023-01-10 PROCEDURE — 83036 HEMOGLOBIN GLYCOSYLATED A1C: CPT

## 2023-01-10 PROCEDURE — 80061 LIPID PANEL: CPT

## 2023-01-10 PROCEDURE — 86803 HEPATITIS C AB TEST: CPT

## 2023-01-10 PROCEDURE — 80053 COMPREHEN METABOLIC PANEL: CPT

## 2023-01-10 PROCEDURE — 36415 COLL VENOUS BLD VENIPUNCTURE: CPT

## 2023-01-20 ENCOUNTER — HOSPITAL ENCOUNTER (OUTPATIENT)
Dept: LAB | Facility: MEDICAL CENTER | Age: 58
End: 2023-01-20
Attending: STUDENT IN AN ORGANIZED HEALTH CARE EDUCATION/TRAINING PROGRAM
Payer: COMMERCIAL

## 2023-01-20 LAB
ALBUMIN SERPL BCP-MCNC: 4.3 G/DL (ref 3.2–4.9)
ALP SERPL-CCNC: 139 U/L (ref 30–99)
ALT SERPL-CCNC: 19 U/L (ref 2–50)
AST SERPL-CCNC: 21 U/L (ref 12–45)
BILIRUB CONJ SERPL-MCNC: <0.2 MG/DL (ref 0.1–0.5)
BILIRUB INDIRECT SERPL-MCNC: ABNORMAL MG/DL (ref 0–1)
BILIRUB SERPL-MCNC: 0.3 MG/DL (ref 0.1–1.5)
GGT SERPL-CCNC: 15 U/L (ref 7–34)
PROT SERPL-MCNC: 7.4 G/DL (ref 6–8.2)

## 2023-01-20 PROCEDURE — 36415 COLL VENOUS BLD VENIPUNCTURE: CPT

## 2023-01-20 PROCEDURE — 80076 HEPATIC FUNCTION PANEL: CPT

## 2023-01-20 PROCEDURE — 82977 ASSAY OF GGT: CPT

## 2023-01-24 ENCOUNTER — HOSPITAL ENCOUNTER (OUTPATIENT)
Dept: LAB | Facility: MEDICAL CENTER | Age: 58
End: 2023-01-24
Attending: INTERNAL MEDICINE
Payer: COMMERCIAL

## 2023-01-24 LAB
T4 FREE SERPL-MCNC: 1.85 NG/DL (ref 0.93–1.7)
TSH SERPL DL<=0.005 MIU/L-ACNC: 0.91 UIU/ML (ref 0.38–5.33)

## 2023-01-24 PROCEDURE — 84439 ASSAY OF FREE THYROXINE: CPT

## 2023-01-24 PROCEDURE — 84443 ASSAY THYROID STIM HORMONE: CPT

## 2023-01-24 PROCEDURE — 36415 COLL VENOUS BLD VENIPUNCTURE: CPT

## 2023-02-24 ENCOUNTER — OFFICE VISIT (OUTPATIENT)
Dept: CARDIOLOGY | Facility: MEDICAL CENTER | Age: 58
End: 2023-02-24
Payer: COMMERCIAL

## 2023-02-24 VITALS
OXYGEN SATURATION: 95 % | HEART RATE: 77 BPM | BODY MASS INDEX: 33.55 KG/M2 | HEIGHT: 65 IN | RESPIRATION RATE: 14 BRPM | SYSTOLIC BLOOD PRESSURE: 128 MMHG | WEIGHT: 201.4 LBS | DIASTOLIC BLOOD PRESSURE: 72 MMHG

## 2023-02-24 DIAGNOSIS — I20.89 OTHER FORMS OF ANGINA PECTORIS (HCC): ICD-10-CM

## 2023-02-24 DIAGNOSIS — R94.31 ABNORMAL EKG: ICD-10-CM

## 2023-02-24 DIAGNOSIS — R94.31 DIFFUSE ST SEGMENT DEPRESSION: Primary | ICD-10-CM

## 2023-02-24 DIAGNOSIS — E78.5 DYSLIPIDEMIA: ICD-10-CM

## 2023-02-24 DIAGNOSIS — I10 ESSENTIAL HYPERTENSION, BENIGN: ICD-10-CM

## 2023-02-24 DIAGNOSIS — Z71.89 COUNSELING ON HEALTH PROMOTION AND DISEASE PREVENTION: ICD-10-CM

## 2023-02-24 LAB — EKG IMPRESSION: NORMAL

## 2023-02-24 PROCEDURE — 99204 OFFICE O/P NEW MOD 45 MIN: CPT | Performed by: INTERNAL MEDICINE

## 2023-02-24 PROCEDURE — 93000 ELECTROCARDIOGRAM COMPLETE: CPT | Performed by: INTERNAL MEDICINE

## 2023-02-24 RX ORDER — AMLODIPINE BESYLATE 5 MG/1
1 TABLET ORAL
COMMUNITY
End: 2023-08-23 | Stop reason: SINTOL

## 2023-02-24 ASSESSMENT — FIBROSIS 4 INDEX: FIB4 SCORE: 0.95

## 2023-02-24 NOTE — PATIENT INSTRUCTIONS
Check your BP daily and share your log with your primary care provider especially if persistently higher than 130/8mmHg.

## 2023-02-24 NOTE — PROGRESS NOTES
"CARDIOLOGY NEW PATIENT:    PCP: Kinza Gomez P.A.-C.    1. Diffuse ST segment depression    2. Counseling on health promotion and disease prevention    3. Essential hypertension, benign    4. Abnormal EKG    5. Dyslipidemia    6. Other forms of angina pectoris (HCC)        Kaleigh Figueroa here for abnormal; EKG consultation.    Chief Complaint   Patient presents with    Abnormal EKG    Dyslipidemia    Hypertension       History: Kaleigh Figueroa is a 57 y.o. female with hypothyroidism, varicose veins, obesity BMI 33.5, dyslipidemia ( 1/2023, normal TG and HDL), HTN (on amlodipine 5mg), referred by PCP at [facility] for an abnormal EKG.    Early January 2023, she had an episode of chest pain radiated to her upper chest muscles that she thought it was \"pulled muscle\" since she was very active at home doing house chores with heavy lifting. She noticed it more with sitting down after doing the chores and with position changes, did not worsen with walking around the house and self resolved after she rested. She felt LH then without SOB, dizziness, syncope, presyncope. No recurrent event ever since. Had milder similar episode few years ago.    Denies shortness of breath, significant dyspnea on exertion, ELIER, PND, orthopnea or claudication.    No exercise routine but stays active.    Has BP machine at home, no BP checks.    EKG personally reviewed: 1/2023  SR, diffuse mild ST depressions    Family history:  Parents alive, mother with PPM  3 sons  1 healthy brother    Significant other with her today    Cardiovascular Risk Factors:  1. Smoking status: No  2. Type II Diabetes Mellitus: No  3. Hypertension: Yes  4. Dyslipidemia: Yes  5. Obesity / metabolic syndrome: Yes  6. Family history of ASCVD: Yes  7. Family history of premature ASCVD in a first degree relative (Male less than 55 years of age; Female less than 60 years of age): No  8. Sedentary lifestyle: Yes  9. Lack of exercise: " "Yes      PE:  /72 (BP Location: Left arm, Patient Position: Sitting, BP Cuff Size: Adult)   Pulse 77   Resp 14   Ht 1.651 m (5' 5\")   Wt 91.4 kg (201 lb 6.4 oz)   LMP 02/01/2016   SpO2 95%   BMI 33.51 kg/m²     Gen: well  HEENT: Symmetric face. Anicteric sclerae. Moist mucus membranes  NECK: No JVD. No lymphadenopathy  CARDIAC: Regular, Normal S1, S2, No murmur  VASCULATURE: carotids are normal bilaterally without bruit  RESP: Clear to auscultation bilaterally  ABD: Soft, non-tender, non-distended  EXT: No edema, no clubbing or cyanosis  SKIN: Warm and dry  NEURO: No gross deficits  PSYCH: Appropriate affect, participates in conversation    The 10-year ASCVD risk score (Aamir ASHFORD, et al., 2019) is: 2.3%    Past Medical History:   Diagnosis Date    Thyroid disease      Past Surgical History:   Procedure Laterality Date    THYROIDECTOMY  2003     Allergies   Allergen Reactions    Codeine Itching    Vicodin [Hydrocodone-Acetaminophen] Itching and Anxiety     Outpatient Encounter Medications as of 2/24/2023   Medication Sig Dispense Refill    amLODIPine (NORVASC) 5 MG Tab Take 1 Tablet by mouth every day.      Levothyroxine Sodium (SYNTHROID PO) Take 75 mcg by mouth.      [DISCONTINUED] ergocalciferol (DRISDOL) 93091 UNIT capsule Take 1 Cap by mouth every 7 days. (Patient not taking: Reported on 2/24/2023) 12 Cap 3    [DISCONTINUED] Calcium Citrate-Vitamin D (CITRACAL + D PO) Take  by mouth. (Patient not taking: Reported on 2/24/2023)       No facility-administered encounter medications on file as of 2/24/2023.     Social History     Socioeconomic History    Marital status:      Spouse name: Not on file    Number of children: Not on file    Years of education: Not on file    Highest education level: Not on file   Occupational History    Not on file   Tobacco Use    Smoking status: Never    Smokeless tobacco: Never   Substance and Sexual Activity    Alcohol use: Yes     Alcohol/week: 0.6 oz     Types: " 1 Glasses of wine per week    Drug use: No    Sexual activity: Never   Other Topics Concern    Not on file   Social History Narrative    Not on file     Social Determinants of Health     Financial Resource Strain: Not on file   Food Insecurity: Not on file   Transportation Needs: Not on file   Physical Activity: Not on file   Stress: Not on file   Social Connections: Not on file   Intimate Partner Violence: Not on file   Housing Stability: Not on file     Family History   Problem Relation Age of Onset    Heart Disease Mother         Pacemaker 70's    Anxiety disorder Mother     Cancer Father     Thyroid Father         Hashimotos    Diabetes Maternal Grandmother     Heart Disease Maternal Grandfather     Diabetes Paternal Grandmother     Heart Disease Paternal Grandfather          Studies    Lab Results   Component Value Date/Time    TSHULTRASEN 0.910 2023 1352        Lab Results   Component Value Date/Time    FREET4 1.85 (H) 2023 1352      Lab Results   Component Value Date/Time    HBA1C 5.5 01/10/2023 01:41 PM     Lab Results   Component Value Date/Time    CHOLSTRLTOT 190 01/10/2023 01:41 PM     (H) 01/10/2023 01:41 PM    HDL 64 01/10/2023 01:41 PM    TRIGLYCERIDE 50 01/10/2023 01:41 PM       Lab Results   Component Value Date/Time    SODIUM 142 01/10/2023 01:41 PM    POTASSIUM 4.2 01/10/2023 01:41 PM    CHLORIDE 105 01/10/2023 01:41 PM    CO2 25 01/10/2023 01:41 PM    GLUCOSE 86 01/10/2023 01:41 PM    BUN 15 01/10/2023 01:41 PM    CREATININE 0.69 01/10/2023 01:41 PM    BUNCREATRAT 17 2018 10:26 AM     Lab Results   Component Value Date/Time    ALKPHOSPHAT 139 (H) 2023 12:11 PM    ASTSGOT 21 2023 12:11 PM    ALTSGPT 19 2023 12:11 PM    TBILIRUBIN 0.3 2023 12:11 PM        Echocardiogram:  No results found for this or any previous visit.    EC23 personally reviewed and interpreted  Sinus rhythm   Consider left atrial enlargement   Diffuse Mild ST depressions,  possible ischemia or repolarization   abnormalities   Electronically Signed On 2- 9:32:56 PST by Rikki Johnson MD     Assessment and Recommendations:    Problem List Items Addressed This Visit       Essential hypertension, benign    Relevant Medications    amLODIPine (NORVASC) 5 MG Tab    Counseling on health promotion and disease prevention    Dyslipidemia    Diffuse ST segment depression - Primary    Relevant Orders    NM-CARDIAC STRESS TEST    Other forms of angina pectoris (HCC)    Relevant Medications    amLODIPine (NORVASC) 5 MG Tab    Other Relevant Orders    NM-CARDIAC STRESS TEST       Given her diffuse ST depressions on EKG with the episode of Chest pain in January, I believe it is warranted to further risk stratify with a cardiac stress test.  Unfortunately, given baseline EKG abnormalities, treadmill EKG stress test is of low sensitivity and low diagnostic yield hence the need for supplementation with imaging.  We will proceed with a chemical nuclear stress test for further risk stratification.  I discussed with her different scenarios of normal stress test, abnormal stress test and nondiagnostic stress test and what would be the next steps.  These include medication adjustments, possible coronary CTA, and or coronary angiography/left heart catheterization.  We will further discuss according to stress test results.    Given her mildly elevated LDL, her estimated 10 yr ASCVD risk is low (<5%).  We discussed at length the importance of eating heart healthy diet and participating more exercise, at least 3 days a week of at least moderate intensity aerobic exercise.  I advised her to keep me posted on her progress specially if she develops any recurrent chest pain/discomfort with exercise. Target LDL <100 and TG <150. TG is at goal thus far.    Hypertension is managed by her PCP.  I advised her to keep a daily log and to share with her PCP especially if her blood pressure is persistently > 130/80mmHg  for further medication adjustments.  Currently on amlodipine 5 mg daily.    Thank you for the opportunity to be involved in Kaleigh Figueroa 's care; and please reach out with any questions or concerns.    Return in about 6 months (around 8/24/2023).    Rikki Johnson MD, MPH [facility]  Interventional Cardiologist  [facility]   of Clinical Internal Medicine - [facility]    ~ Portions of this note were completed using voice recognition software (Dragon Naturally speaking software) . Occasional transcription errors may have escaped proof reading. I have made every reasonable attempt to correct obvious errors, but I expect that there are errors of grammar and possibly content that I did not discover before finalizing the note. ~

## 2023-02-27 ENCOUNTER — HOSPITAL ENCOUNTER (OUTPATIENT)
Dept: RADIOLOGY | Facility: MEDICAL CENTER | Age: 58
End: 2023-02-27
Attending: INTERNAL MEDICINE
Payer: COMMERCIAL

## 2023-02-27 DIAGNOSIS — R94.31 ABNORMAL EKG: ICD-10-CM

## 2023-02-27 DIAGNOSIS — I20.89 OTHER FORMS OF ANGINA PECTORIS (HCC): ICD-10-CM

## 2023-02-27 DIAGNOSIS — R94.31 DIFFUSE ST SEGMENT DEPRESSION: ICD-10-CM

## 2023-02-27 PROCEDURE — 700111 HCHG RX REV CODE 636 W/ 250 OVERRIDE (IP)

## 2023-02-27 PROCEDURE — 93018 CV STRESS TEST I&R ONLY: CPT | Performed by: INTERNAL MEDICINE

## 2023-02-27 PROCEDURE — 78452 HT MUSCLE IMAGE SPECT MULT: CPT

## 2023-02-27 PROCEDURE — 78452 HT MUSCLE IMAGE SPECT MULT: CPT | Mod: 26 | Performed by: INTERNAL MEDICINE

## 2023-02-27 RX ORDER — REGADENOSON 0.08 MG/ML
INJECTION, SOLUTION INTRAVENOUS
Status: COMPLETED
Start: 2023-02-27 | End: 2023-02-27

## 2023-02-27 RX ORDER — AMINOPHYLLINE 25 MG/ML
100 INJECTION, SOLUTION INTRAVENOUS
Status: DISCONTINUED | OUTPATIENT
Start: 2023-02-27 | End: 2023-02-28 | Stop reason: HOSPADM

## 2023-02-27 RX ORDER — REGADENOSON 0.08 MG/ML
0.4 INJECTION, SOLUTION INTRAVENOUS ONCE
Status: COMPLETED | OUTPATIENT
Start: 2023-02-27 | End: 2023-02-27

## 2023-02-27 RX ADMIN — REGADENOSON 0.4 MG: 0.08 INJECTION, SOLUTION INTRAVENOUS at 12:47

## 2023-06-13 ENCOUNTER — APPOINTMENT (RX ONLY)
Dept: URBAN - METROPOLITAN AREA CLINIC 6 | Facility: CLINIC | Age: 58
Setting detail: DERMATOLOGY
End: 2023-06-13

## 2023-06-13 DIAGNOSIS — L64.8 OTHER ANDROGENIC ALOPECIA: ICD-10-CM | Status: WORSENING

## 2023-06-13 DIAGNOSIS — D18.0 HEMANGIOMA: ICD-10-CM

## 2023-06-13 DIAGNOSIS — L82.1 OTHER SEBORRHEIC KERATOSIS: ICD-10-CM

## 2023-06-13 DIAGNOSIS — D22 MELANOCYTIC NEVI: ICD-10-CM

## 2023-06-13 DIAGNOSIS — L81.4 OTHER MELANIN HYPERPIGMENTATION: ICD-10-CM

## 2023-06-13 DIAGNOSIS — Z71.89 OTHER SPECIFIED COUNSELING: ICD-10-CM

## 2023-06-13 PROBLEM — D22.61 MELANOCYTIC NEVI OF RIGHT UPPER LIMB, INCLUDING SHOULDER: Status: ACTIVE | Noted: 2023-06-13

## 2023-06-13 PROBLEM — D22.5 MELANOCYTIC NEVI OF TRUNK: Status: ACTIVE | Noted: 2023-06-13

## 2023-06-13 PROBLEM — D18.01 HEMANGIOMA OF SKIN AND SUBCUTANEOUS TISSUE: Status: ACTIVE | Noted: 2023-06-13

## 2023-06-13 PROCEDURE — 99213 OFFICE O/P EST LOW 20 MIN: CPT

## 2023-06-13 PROCEDURE — ? SUNSCREEN RECOMMENDATIONS

## 2023-06-13 PROCEDURE — ? PHOTO-DOCUMENTATION

## 2023-06-13 PROCEDURE — ? ADDITIONAL NOTES

## 2023-06-13 PROCEDURE — ? COUNSELING

## 2023-06-13 ASSESSMENT — LOCATION DETAILED DESCRIPTION DERM
LOCATION DETAILED: RIGHT LATERAL PROXIMAL UPPER ARM
LOCATION DETAILED: MEDIAL FRONTAL SCALP
LOCATION DETAILED: LEFT SUPERIOR MEDIAL UPPER BACK
LOCATION DETAILED: LEFT INFERIOR UPPER BACK
LOCATION DETAILED: LEFT MEDIAL UPPER BACK

## 2023-06-13 ASSESSMENT — LOCATION SIMPLE DESCRIPTION DERM
LOCATION SIMPLE: RIGHT UPPER ARM
LOCATION SIMPLE: LEFT UPPER BACK
LOCATION SIMPLE: FRONTAL SCALP

## 2023-06-13 ASSESSMENT — LOCATION ZONE DERM
LOCATION ZONE: SCALP
LOCATION ZONE: ARM
LOCATION ZONE: TRUNK

## 2023-06-13 NOTE — PROCEDURE: PHOTO-DOCUMENTATION
Details (Free Text): Appears most consistent with benign vs. dysplasia nevus. Will continue to monitor and recheck at f/u visit.
Detail Level: Detailed
Photo Preface (Leave Blank If You Do Not Want): Photographs were obtained today

## 2023-06-13 NOTE — PROCEDURE: ADDITIONAL NOTES
Render Risk Assessment In Note?: no
Additional Notes: Recommended minoxidil 5% foam QD and Marian D’or shampoo/conditioner.
Detail Level: Detailed

## 2023-07-28 ENCOUNTER — HOSPITAL ENCOUNTER (OUTPATIENT)
Dept: LAB | Facility: MEDICAL CENTER | Age: 58
End: 2023-07-28
Attending: PHYSICIAN ASSISTANT
Payer: COMMERCIAL

## 2023-07-28 PROCEDURE — 84439 ASSAY OF FREE THYROXINE: CPT

## 2023-07-28 PROCEDURE — 84443 ASSAY THYROID STIM HORMONE: CPT

## 2023-07-28 PROCEDURE — 36415 COLL VENOUS BLD VENIPUNCTURE: CPT

## 2023-07-29 LAB
T4 FREE SERPL-MCNC: 1.89 NG/DL (ref 0.93–1.7)
TSH SERPL DL<=0.005 MIU/L-ACNC: 0.91 UIU/ML (ref 0.38–5.33)

## 2023-08-23 ENCOUNTER — OFFICE VISIT (OUTPATIENT)
Dept: CARDIOLOGY | Facility: MEDICAL CENTER | Age: 58
End: 2023-08-23
Attending: INTERNAL MEDICINE
Payer: COMMERCIAL

## 2023-08-23 VITALS
RESPIRATION RATE: 16 BRPM | HEART RATE: 73 BPM | SYSTOLIC BLOOD PRESSURE: 162 MMHG | BODY MASS INDEX: 34.32 KG/M2 | OXYGEN SATURATION: 94 % | DIASTOLIC BLOOD PRESSURE: 108 MMHG | WEIGHT: 206 LBS | HEIGHT: 65 IN

## 2023-08-23 DIAGNOSIS — I10 ESSENTIAL HYPERTENSION, BENIGN: Primary | ICD-10-CM

## 2023-08-23 DIAGNOSIS — E78.5 DYSLIPIDEMIA: ICD-10-CM

## 2023-08-23 DIAGNOSIS — E66.09 CLASS 1 OBESITY DUE TO EXCESS CALORIES WITHOUT SERIOUS COMORBIDITY WITH BODY MASS INDEX (BMI) OF 34.0 TO 34.9 IN ADULT: ICD-10-CM

## 2023-08-23 DIAGNOSIS — R06.09 DOE (DYSPNEA ON EXERTION): ICD-10-CM

## 2023-08-23 DIAGNOSIS — R53.82 CHRONIC FATIGUE: ICD-10-CM

## 2023-08-23 DIAGNOSIS — R94.31 DIFFUSE ST SEGMENT DEPRESSION: ICD-10-CM

## 2023-08-23 DIAGNOSIS — R06.83 SNORES: ICD-10-CM

## 2023-08-23 DIAGNOSIS — Z71.89 COUNSELING ON HEALTH PROMOTION AND DISEASE PREVENTION: ICD-10-CM

## 2023-08-23 PROBLEM — E66.811 CLASS 1 OBESITY DUE TO EXCESS CALORIES WITHOUT SERIOUS COMORBIDITY WITH BODY MASS INDEX (BMI) OF 34.0 TO 34.9 IN ADULT: Status: ACTIVE | Noted: 2023-08-23

## 2023-08-23 PROCEDURE — 3077F SYST BP >= 140 MM HG: CPT | Performed by: INTERNAL MEDICINE

## 2023-08-23 PROCEDURE — 3080F DIAST BP >= 90 MM HG: CPT | Performed by: INTERNAL MEDICINE

## 2023-08-23 PROCEDURE — 99213 OFFICE O/P EST LOW 20 MIN: CPT | Performed by: INTERNAL MEDICINE

## 2023-08-23 PROCEDURE — 99213 OFFICE O/P EST LOW 20 MIN: CPT

## 2023-08-23 PROCEDURE — 99214 OFFICE O/P EST MOD 30 MIN: CPT | Performed by: INTERNAL MEDICINE

## 2023-08-23 RX ORDER — VALSARTAN 80 MG/1
TABLET ORAL
COMMUNITY
Start: 2023-08-14 | End: 2023-08-23 | Stop reason: SDUPTHER

## 2023-08-23 RX ORDER — VALSARTAN 80 MG/1
80 TABLET ORAL 2 TIMES DAILY
Qty: 180 TABLET | Refills: 3 | Status: SHIPPED | OUTPATIENT
Start: 2023-08-23

## 2023-08-23 ASSESSMENT — FIBROSIS 4 INDEX: FIB4 SCORE: .963546082256359417

## 2023-08-23 NOTE — PATIENT INSTRUCTIONS
Increase Valsartan 80mg am and pm  Check your Blood pressure am and pm, 1 hour after taking the valsartan, and send me the log in 1 week via Number 100.  Referral to sleep medicine placed    Look into Noom for weight loss.    Mediterranean diet.  Pritikin - used at [facility], probably one of the best for anti-inflammatory  DASH DIET - American Heart Association (mostly for hypertension)  Ornish Diet   Vegan diet (proven to reverse vascular disease)     Resources to learn more:  American Heart Association   Www.Cardiosmart.org, sponsored by the American College of cardiology.     A great diet is a healthy one that is sustainable with the ability to commit to it long term. Happy to see your will to make a change.     Intermittent fasting is also an additional option: limit your intake of food / drinks to 8 hours in a 24 hour time frame. It can be tough to adapt in the beginning and you can try it 1-2 days a week as a start and eventually will become a lifestyle.     Semaglutide:    Note: For use as an adjunct to diet and exercise in patients with a BMI ?30 kg/m2, or in patients with a BMI ?27 kg/m2 and ?1 weight-associated comorbidity (eg, hypertension, dyslipidemia).    SUBQ: Initiate and adjust dose using the following schedule: In patients who do not tolerate a dosage increase, consider delaying the increase for an additional 4 weeks:  Week 1 through week 4 : 0.25 mg once weekly.  Week 5 through week 8: 0.5 mg once weekly.  Week 9 through week 12: 1 mg once weekly.  Week 13 through week 16: 1.7 mg once weekly.  Week 17 and thereafter (maintenance dosage): 2.4 mg once weekly; if not tolerated, may temporarily decrease dosage to 1.7 mg once weekly for up to 4 additional weeks, then increase to 2.4 mg once weekly.    Note: The  recommends discontinuing therapy in patients who cannot tolerate the 2.4 mg/week dosage; however, some patients may achieve goal weight loss on a submaximal dose  and may continue on the maximum tolerated dose (even if <2.4 mg/week). Consider discontinuation if at least 5% of baseline body weight loss has not been achieved within 3 months .    Missed dose: Missed dose should be administered as soon as possible within 5 days; resume usual schedule thereafter. If >5 days have elapsed, skip the missed dose and resume administration at the next scheduled weekly dose. If 2 or more consecutive doses are missed, resume dosing as scheduled; alternatively, may reinitiate dosage adjustment schedule.    The following relatively common adverse drug reactions and incidences are derived from product labeling unless otherwise specified. Adverse reactions reported in adults, unless otherwise noted ; >10%: Abdominal pain, constipation, diarrhea , nausea, vomiting, Fatigue , headache.

## 2023-08-23 NOTE — PROGRESS NOTES
CARDIOLOGY established PATIENT:    PCP: Kinza Gomez P.A.-C.    1. Essential hypertension, benign    2. Dyslipidemia    3. Class 1 obesity due to excess calories without serious comorbidity with body mass index (BMI) of 34.0 to 34.9 in adult    4. Diffuse ST segment depression    5. Counseling on health promotion and disease prevention    6. JACOB (dyspnea on exertion)    7. Chronic fatigue    8. Snores        Kaleigh Figueroa here for HTN follow up.    Chief Complaint   Patient presents with    Hypertension     F/v dx: Essential hypertension, benign    Dyslipidemia    Angina (Stable)     F/v dx: Other forms of angina pectoris (HCC)       History:  Kaleigh Figueroa is a 58 y.o. female with hypothyroidism, thyroid nodules, Hashimotos, varicose veins, class 1 obesity BMI 30-35, dyslipidemia ( 1/2023, normal TG and HDL), HTN initially referred by PCP at [facility] for an abnormal EKG and established care in my clinic 2/24/23, is here for follow-up.    Clinic 2/24/2023: Due to EKG abnormalities with diffuse ST depressions and some chest pain concerns, we obtained a Lexiscan for further risk stratification that showed no signs of ischemia with normal TID and preserved LV systolic function.    PCP changed amlodipine to valsartan 2 weeks ago due to ELIER. No BP log at home. Has stale JACOB. ELIER resolved ever since. Also has nausea and dizziness that also resolved after stopping amlodipine.    No exercise routine, limited by knee pains. Enjoys walking otherwise. Admits to not eating a HH diet, can do better.    Snores per partner. Has chronic fatigue. Restless sleeping. Did not previously schedule sleep med appt due to insurance issues. Happy to reconnect with them.    No home BP log.    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)  3 sons  1 healthy brother    Significant other with her today    Functional status:    MMRC Grade: 1-2  0: Breathless only during strenuous exercise  1: Short of breath when hurrying or going up a small hill  2: Walks slower than friends due to breathlessness, has to stop at own pace  3: Stops to catch breath on level ground after 100m  4: Breathless with ambulating around house or ADLs      PE:  BP (!) 162/108 (BP Location: Left arm, Patient Position: Sitting, BP Cuff Size: Adult)   Pulse 73   Resp 16   Ht 1.651 m (5' 5\")   Wt 93.4 kg (206 lb)   LMP 02/01/2016   SpO2 94%   BMI 34.28 kg/m²     Gen: well  HEENT: Symmetric face. Anicteric sclerae. Moist mucus membranes  NECK: No JVD.   CARDIAC: Regular, Normal S1, S2, No murmur  VASCULATURE: carotids are normal bilaterally without bruit  RESP: Clear to auscultation bilaterally   EXT: No edema, no clubbing or cyanosis  SKIN: Warm and dry  NEURO: No gross deficits  PSYCH: Appropriate affect, participates in conversation    The 10-year ASCVD risk score (Aamir DK, et al., 2019) is: 5.5%    Past Medical History:   Diagnosis Date    Thyroid disease      Past Surgical History:   Procedure Laterality Date    THYROIDECTOMY  2003     Allergies   Allergen Reactions    Codeine Itching    Amlodipine Swelling     Nausea, dizziness, ELIER    Vicodin [Hydrocodone-Acetaminophen] Itching and Anxiety     Outpatient Encounter Medications as of 8/23/2023   Medication Sig Dispense Refill    valsartan (DIOVAN) 80 MG Tab Take 1 Tablet by mouth 2 times a day. 180 Tablet 3    Semaglutide (WEGOVY) 0.25 MG/0.5ML Solution Auto-injector Pen-injector Inject 0.5 mL under the skin every 7 days. 2 mL 3    Levothyroxine Sodium (SYNTHROID PO) Take 75 mcg by mouth.      [DISCONTINUED] valsartan (DIOVAN) 80 MG Tab TAKE ONE TABLET BY MOUTH DAILY FOR BLOOD PRESSURE LOWERING      [DISCONTINUED] amLODIPine (NORVASC) 5 MG Tab Take 1 Tablet by mouth every day. (Patient not " taking: Reported on 8/23/2023)       No facility-administered encounter medications on file as of 8/23/2023.     Social History     Socioeconomic History    Marital status:      Spouse name: Not on file    Number of children: Not on file    Years of education: Not on file    Highest education level: Not on file   Occupational History    Not on file   Tobacco Use    Smoking status: Never    Smokeless tobacco: Never   Substance and Sexual Activity    Alcohol use: Yes     Alcohol/week: 0.6 oz     Types: 1 Glasses of wine per week    Drug use: No    Sexual activity: Never   Other Topics Concern    Not on file   Social History Narrative    Not on file     Social Determinants of Health     Financial Resource Strain: Not on file   Food Insecurity: Not on file   Transportation Needs: Not on file   Physical Activity: Not on file   Stress: Not on file   Social Connections: Not on file   Intimate Partner Violence: Not on file   Housing Stability: Not on file     Family History   Problem Relation Age of Onset    Heart Disease Mother         Pacemaker 70's    Anxiety disorder Mother     Cancer Father     Thyroid Father         Hashimotos    Diabetes Maternal Grandmother     Heart Disease Maternal Grandfather     Diabetes Paternal Grandmother     Heart Disease Paternal Grandfather          Studies    Lab Results   Component Value Date/Time    TSHULTRASEN 0.910 07/28/2023 1234        Lab Results   Component Value Date/Time    FREET4 1.89 (H) 07/28/2023 1234      Lab Results   Component Value Date/Time    HBA1C 5.5 01/10/2023 01:41 PM     Lab Results   Component Value Date/Time    CHOLSTRLTOT 190 01/10/2023 01:41 PM     (H) 01/10/2023 01:41 PM    HDL 64 01/10/2023 01:41 PM    TRIGLYCERIDE 50 01/10/2023 01:41 PM       Lab Results   Component Value Date/Time    SODIUM 142 01/10/2023 01:41 PM    POTASSIUM 4.2 01/10/2023 01:41 PM    CHLORIDE 105 01/10/2023 01:41 PM    CO2 25 01/10/2023 01:41 PM    GLUCOSE 86 01/10/2023  01:41 PM    BUN 15 01/10/2023 01:41 PM    CREATININE 0.69 01/10/2023 01:41 PM    BUNCREATRAT 17 2018 10:26 AM     Lab Results   Component Value Date/Time    ALKPHOSPHAT 139 (H) 2023 12:11 PM    ASTSGOT 21 2023 12:11 PM    ALTSGPT 19 2023 12:11 PM    TBILIRUBIN 0.3 2023 12:11 PM        Echocardiogram:  No results found for this or any previous visit.    EC23 personally reviewed and interpreted  Sinus rhythm   Consider left atrial enlargement   Diffuse Mild ST depressions, possible ischemia or repolarization   abnormalities   Electronically Signed On 2023 9:32:56 PST by Rikki Johnson MD     Assessment and Recommendations:    Problem List Items Addressed This Visit       Essential hypertension, benign - Primary    Relevant Medications    valsartan (DIOVAN) 80 MG Tab    Semaglutide (WEGOVY) 0.25 MG/0.5ML Solution Auto-injector Pen-injector    Other Relevant Orders    Referral to Pulmonary and Sleep Medicine    Counseling on health promotion and disease prevention    Dyslipidemia    Diffuse ST segment depression    Class 1 obesity due to excess calories without serious comorbidity with body mass index (BMI) of 34.0 to 34.9 in adult    Relevant Medications    Semaglutide (WEGOVY) 0.25 MG/0.5ML Solution Auto-injector Pen-injector    Chronic fatigue    Relevant Medications    Semaglutide (WEGOVY) 0.25 MG/0.5ML Solution Auto-injector Pen-injector    Other Relevant Orders    Referral to Pulmonary and Sleep Medicine    JACOB (dyspnea on exertion)    Relevant Medications    Semaglutide (WEGOVY) 0.25 MG/0.5ML Solution Auto-injector Pen-injector     Other Visit Diagnoses       Snores        Relevant Orders    Referral to Pulmonary and Sleep Medicine          Overall Ms. Figueroa is doing well from a cardiovascular standpoint and today we focused our discussion on importance of eating heart healthy diet, participating in more aerobic exercise with importance of weight loss for ongoing  primary ASCVD prevention.  Gave her more instructions in the AVS.    Given her comorbidity of high blood pressure with her class I obesity, BMI > 30, discussed with her the option of utilizing Wegovy/semaglutide and gave him instructions in the AVS and she agrees to try it.  Advised her to keep me posted on a monthly basis before we increase her dose to target dose 2.5 mg weekly based on her tolerance.    Given her mildly elevated LDL, her estimated 10 yr ASCVD risk is low (<5%).  We discussed at length the importance of eating heart healthy diet and participating more exercise, at least 3 days a week of at least moderate intensity aerobic exercise. Target LDL <100 and TG <150. TG is at goal thus far.     Hypertension is managed by her PCP but PCP leaving.  To help out, I advised her to increase valsartan to 80mg BID and send me BID BP log in 1 week. Target <130/80mmHg for further medication adjustments.     Placed another referral to sleep medicine for sleep apnea testing and management accordingly.  She is interested.    Recommend annual lipid panel, CMP and CBC.  Can be managed by PCP team.    Thank you for the opportunity to be involved in Kaleigh Figueroa 's care; and please reach out with any questions or concerns.    Return in about 6 months (around 2/23/2024), or if symptoms worsen or fail to improve.    Rikki Johnson MD, MPH [facility]  Interventional Cardiologist  [facility]   of Clinical Internal Medicine - [facility]    ~ Portions of this note were completed using voice recognition software (Dragon Naturally speaking software) . Occasional transcription errors may have escaped proof reading. I have made every reasonable attempt to correct obvious errors, but I expect that there are errors of grammar and possibly content that I did not discover before finalizing the note. ~

## 2023-09-14 ENCOUNTER — TELEPHONE (OUTPATIENT)
Dept: CARDIOLOGY | Facility: MEDICAL CENTER | Age: 58
End: 2023-09-14
Payer: COMMERCIAL

## 2023-09-14 NOTE — TELEPHONE ENCOUNTER
Phone Number Called: [phone]    Call outcome: Left detailed message and requested call back    Message: Called to discuss BP log and s/s related to same.   ______________________________     Phone Number Called: [phone]    Call outcome: Left message for pt requesting return call.    Message: Called to discuss phone call from [facility] and to obtain recent BP log for provider review.

## 2023-09-14 NOTE — TELEPHONE ENCOUNTER
AL          Caller: Sena with [facility]    Topic/issue: They were returning our call and they were asking for a call back    Callback Number: [phone]      Thank you    -Marcin SEXTON

## 2023-09-15 NOTE — TELEPHONE ENCOUNTER
Phone Number Called: [phone]    Call outcome: Spoke to Sena with [facility]    Message: Pt was seen at [facility] to establish new PCP. BP at appt was 160/94. Pt was instructed to keep BP log at last OV. Unable to reach pt to obtain BP log for AL review. Informed Sena that pt must either provide log of recent BP readings or call to schedule an appt.     Per Sena, pt has OV scheduled at [facility] for BP recheck on 10/26.

## 2023-09-19 ENCOUNTER — PATIENT MESSAGE (OUTPATIENT)
Dept: CARDIOLOGY | Facility: MEDICAL CENTER | Age: 58
End: 2023-09-19
Payer: COMMERCIAL

## 2023-09-20 ENCOUNTER — TELEPHONE (OUTPATIENT)
Dept: HEALTH INFORMATION MANAGEMENT | Facility: OTHER | Age: 58
End: 2023-09-20

## 2023-09-21 ENCOUNTER — PATIENT MESSAGE (OUTPATIENT)
Dept: CARDIOLOGY | Facility: MEDICAL CENTER | Age: 58
End: 2023-09-21
Payer: COMMERCIAL

## 2023-09-21 RX ORDER — LEVOTHYROXINE SODIUM 0.07 MG/1
75 TABLET ORAL
COMMUNITY

## 2023-09-21 RX ORDER — LEVOTHYROXINE SODIUM 88 UG/1
88 TABLET ORAL
COMMUNITY

## 2023-11-06 ENCOUNTER — OFFICE VISIT (OUTPATIENT)
Dept: URGENT CARE | Facility: PHYSICIAN GROUP | Age: 58
End: 2023-11-06
Payer: COMMERCIAL

## 2023-11-06 VITALS
BODY MASS INDEX: 32.82 KG/M2 | SYSTOLIC BLOOD PRESSURE: 114 MMHG | RESPIRATION RATE: 16 BRPM | DIASTOLIC BLOOD PRESSURE: 70 MMHG | WEIGHT: 197 LBS | HEIGHT: 65 IN | HEART RATE: 74 BPM | TEMPERATURE: 97.6 F | OXYGEN SATURATION: 97 %

## 2023-11-06 DIAGNOSIS — R55 PRE-SYNCOPE: ICD-10-CM

## 2023-11-06 DIAGNOSIS — S09.90XA CLOSED HEAD INJURY, INITIAL ENCOUNTER: ICD-10-CM

## 2023-11-06 DIAGNOSIS — T41.3X5S: ICD-10-CM

## 2023-11-06 PROCEDURE — 3074F SYST BP LT 130 MM HG: CPT

## 2023-11-06 PROCEDURE — 99215 OFFICE O/P EST HI 40 MIN: CPT

## 2023-11-06 PROCEDURE — 3078F DIAST BP <80 MM HG: CPT

## 2023-11-06 ASSESSMENT — FIBROSIS 4 INDEX: FIB4 SCORE: 1.05

## 2023-11-07 NOTE — PROGRESS NOTES
Subjective:   Kaleigh Figueroa is a 58 y.o. female who presents for Other (Been having a cough, fever, (R) eye is twitching and states that she feels like she is going to pass out. Pt also concern of a UTI)      HPI:    Patient presents to urgent care with concerns of pre-syncopal sensations.   States she experienced a LOC last night while on the toilet and sustained a head injury.   She has a headache currently and feels light she is going to pass out again.  Denies CP, SOB, palpitations, leg swelling, orthopnea.    She reports right eye twitching, low grade fever, and a nonproductive cough.  Unsure if she has UTI.      ROS As above in HPI    Medications:    Current Outpatient Medications on File Prior to Visit   Medication Sig Dispense Refill    levothyroxine (SYNTHROID) 88 MCG Tab Take 88 mcg by mouth every morning on an empty stomach. 2 days per week      valsartan (DIOVAN) 80 MG Tab Take 1 Tablet by mouth 2 times a day. 180 Tablet 3    meloxicam (MOBIC) 15 MG tablet Take 1 Tablet by mouth every day. 30 Tablet 0    levothyroxine (SYNTHROID) 75 MCG Tab Take 75 mcg by mouth every morning on an empty stomach. 5 days per week      Semaglutide (WEGOVY) 0.25 MG/0.5ML Solution Auto-injector Pen-injector Inject 0.5 mL under the skin every 7 days. 2 mL 3     No current facility-administered medications on file prior to visit.        Allergies:   Codeine, Amlodipine, and Vicodin [hydrocodone-acetaminophen]    Problem List:   Patient Active Problem List   Diagnosis    Postsurgical hypothyroidism    Adjustment disorder with mixed anxiety and depressed mood    Hot flashes    Acute bilateral low back pain without sciatica    Osteopenia    Acute folliculitis    Closed fracture of phalanx of right fifth toe with routine healing    Vitamin D insufficiency    Wound infection    Adjustment disorder with depressed mood    Dog bite    Essential hypertension, benign    Counseling on health promotion and disease prevention     "Dyslipidemia    Diffuse ST segment depression    Other forms of angina pectoris    Class 1 obesity due to excess calories without serious comorbidity with body mass index (BMI) of 34.0 to 34.9 in adult    Chronic fatigue    JACOB (dyspnea on exertion)        Surgical History:  Past Surgical History:   Procedure Laterality Date    THYROIDECTOMY  2003       Past Social Hx:   Social History     Tobacco Use    Smoking status: Never    Smokeless tobacco: Never   Substance Use Topics    Alcohol use: Yes     Alcohol/week: 0.6 oz     Types: 1 Glasses of wine per week    Drug use: No          Problem list, medications, and allergies reviewed by myself today in Epic.     Objective:     /70 (BP Location: Left arm, Patient Position: Sitting, BP Cuff Size: Adult long)   Pulse 74   Temp 36.4 °C (97.6 °F) (Temporal)   Resp 16   Ht 1.651 m (5' 5\")   Wt 89.4 kg (197 lb)   LMP 02/01/2016   SpO2 97%   BMI 32.78 kg/m²     Physical Exam  Vitals and nursing note reviewed.   Constitutional:       General: She is not in acute distress.     Appearance: Normal appearance. She is not ill-appearing or diaphoretic.   Cardiovascular:      Rate and Rhythm: Normal rate and regular rhythm.      Heart sounds: Normal heart sounds.   Pulmonary:      Effort: Pulmonary effort is normal.      Breath sounds: Normal breath sounds.   Skin:     General: Skin is warm.      Capillary Refill: Capillary refill takes less than 2 seconds.      Findings: No rash.   Neurological:      Mental Status: She is alert and oriented to person, place, and time.         Assessment/Plan:       Diagnosis and associated orders:   1. Pre-syncope    2. Closed head injury, initial encounter    3. Local anesthetic drug adverse reaction, sequela        Comments/MDM:     Patient transferred to nearest ER for evaluation of syncopal episode while siting on the toilet last night. Reports she sustained a head injury with LOC. She currently reports pre-syncopal sensations as " well.   Declined EMS. Patient's SO is accompanying her and verbalized he will take her to the nearest ER for evaluation.   Follow up with PCP advised upon discharge.       Please note that this dictation was created using voice recognition software. I have made a reasonable attempt to correct obvious errors, but I expect that there are errors of grammar and possibly content that I did not discover before finalizing the note.    This note was electronically signed by HAILEY Mcdaniel

## 2023-11-13 ENCOUNTER — TELEPHONE (OUTPATIENT)
Dept: SCHEDULING | Facility: IMAGING CENTER | Age: 58
End: 2023-11-13
Payer: COMMERCIAL

## 2024-02-16 ENCOUNTER — TELEPHONE (OUTPATIENT)
Dept: HEALTH INFORMATION MANAGEMENT | Facility: OTHER | Age: 59
End: 2024-02-16
Payer: COMMERCIAL

## 2024-03-27 ENCOUNTER — TELEPHONE (OUTPATIENT)
Dept: SCHEDULING | Facility: IMAGING CENTER | Age: 59
End: 2024-03-27

## 2024-04-29 ASSESSMENT — ENCOUNTER SYMPTOMS: SLEEP DISTURBANCE: 0

## 2024-04-30 ENCOUNTER — OFFICE VISIT (OUTPATIENT)
Dept: SLEEP MEDICINE | Facility: MEDICAL CENTER | Age: 59
End: 2024-04-30
Attending: INTERNAL MEDICINE
Payer: COMMERCIAL

## 2024-04-30 ENCOUNTER — HOSPITAL ENCOUNTER (OUTPATIENT)
Dept: LAB | Facility: MEDICAL CENTER | Age: 59
End: 2024-04-30
Attending: INTERNAL MEDICINE
Payer: COMMERCIAL

## 2024-04-30 VITALS
WEIGHT: 208.4 LBS | HEART RATE: 68 BPM | SYSTOLIC BLOOD PRESSURE: 142 MMHG | DIASTOLIC BLOOD PRESSURE: 84 MMHG | BODY MASS INDEX: 34.72 KG/M2 | OXYGEN SATURATION: 96 % | HEIGHT: 65 IN | RESPIRATION RATE: 16 BRPM

## 2024-04-30 DIAGNOSIS — R53.82 CHRONIC FATIGUE: ICD-10-CM

## 2024-04-30 DIAGNOSIS — I10 ESSENTIAL HYPERTENSION, BENIGN: ICD-10-CM

## 2024-04-30 DIAGNOSIS — R06.83 SNORES: ICD-10-CM

## 2024-04-30 DIAGNOSIS — G47.30 SLEEP DISORDER BREATHING: Primary | ICD-10-CM

## 2024-04-30 LAB
T4 FREE SERPL-MCNC: 1.8 NG/DL (ref 0.93–1.7)
TSH SERPL DL<=0.005 MIU/L-ACNC: 1.39 UIU/ML (ref 0.38–5.33)

## 2024-04-30 PROCEDURE — 99211 OFF/OP EST MAY X REQ PHY/QHP: CPT | Performed by: INTERNAL MEDICINE

## 2024-04-30 ASSESSMENT — PATIENT HEALTH QUESTIONNAIRE - PHQ9: CLINICAL INTERPRETATION OF PHQ2 SCORE: 0

## 2024-04-30 ASSESSMENT — FIBROSIS 4 INDEX: FIB4 SCORE: 2.263665833672441086

## 2024-04-30 NOTE — PROGRESS NOTES
[facility] Sleep Center Consult Note     Date: 4/30/2024 / Time: 12:34 PM      Thank you for requesting a sleep medicine consultation on Kaleigh Figueroa at the sleep center. Presents today with the chief complaints of snoring, brain fog, fatigue. She is referred by Rikki Johnson M.D.  [address] [place] [address],  [place] [id number] for evaluation and treatment of sleep disorder breathing.     HISTORY OF PRESENT ILLNESS:     Kaleigh Figueroa is a 59 y.o. female with hypothyroidism, hypertension, obesity, snoring, and unrefreshing sleep.  Presents to Sleep Clinic for evaluation of sleep.    She states she was referred to sleep medicine by her cardiologist.  She was following with cardiology regarding abnormal EKG and blood pressure.  She states she has undergone stress test and they have not found a distinct reason for her diffuse ST segment depression.    Regarding sleep she has no trouble generally falling asleep.  When she is asleep she awakens about 2-4 times a night and often uses the restroom during this time.  She generally does not trouble getting back to sleep.  She will occasionally nap.  If she does nap she will nap for 15 minutes.    She has been told that she snores in her sleep.  She denies any witnessed apneas.  She does report that she has awoken almost as if it is choking but not quite where it felt hard to breathe.  She does mouth breathe at night and will have a dry mouth at times.  She has been told that she grinds her teeth.  Her sleep is nonrestorative.    During the day she can have brain fog.  In addition she can have low energy at times.    As per supplemental questionnaire to be scanned or imported into chart:    [place] Sleepiness Score: 5    Sleep Schedule  Bedtime: Weekday 1030pm to 1 am Weekend same   Wake time: Weekday 730-8am Weekend same   Sleep-onset latency: 20-30 min   Awakenings from sleep: 2-4 times a night  Difficulty falling back asleep: Not generally   Bedroom partner:  "yes   Naps: Yes sometimes 15 min naps,  1-2 times a week if at all.     DAYTIME SYMPTOMS:   Excessive daytime sleepiness: No   Daytime fatigue: Yes  Difficulty concentrating: Yes  Memory problems: Yes names   Irritability:No   Work/school performance issues: No ,  Sleepiness with driving: No   Caffeine/stimulant use: Yes  Alcohol use:No     SLEEP RELATED SYMPTOMS  Snoring: Yes  Witnessed apnea or gasping/choking: Yes, wakes up feeling harder to breath maybe choking   Dry mouth or mouth breathing: Yes  Sweating: No   Teeth grinding/biting: Yes  Morning headaches: No   Refreshed Upon Awakening: No      SLEEP RELATED BEHAVIORS:  Parasomnias (walking, talking, eating, violence): No   Leg kicking: No   Restless legs - \"urge to move\": Yes 1-2 times a month   Nightmares: No  Recurrent: No   Dream enactment: No      NARCOLEPSY:  Cataplexy: No   Sleep paralysis: No   Sleep attacks: No   Hypnagogic/hypnopompic hallucinations: No     MEDICAL HISTORY  Past Medical History:   Diagnosis Date    Insomnia     Thyroid disease         SURGICAL HISTORY  Past Surgical History:   Procedure Laterality Date    THYROIDECTOMY  2003        FAMILY HISTORY  Family History   Problem Relation Age of Onset    Heart Disease Mother         Pacemaker 70's    Anxiety disorder Mother     Cancer Father     Thyroid Father         Hashimotos    Diabetes Maternal Grandmother     Heart Disease Maternal Grandfather     Diabetes Paternal Grandmother     Heart Disease Paternal Grandfather        SOCIAL HISTORY  Social History     Socioeconomic History    Marital status:    Tobacco Use    Smoking status: Never    Smokeless tobacco: Never   Substance and Sexual Activity    Alcohol use: Not Currently     Comment: [facility]    Drug use: No    Sexual activity: Never        Occupation: homemaker     CURRENT MEDICATIONS  Current Outpatient Medications   Medication Sig Dispense Refill    levothyroxine (SYNTHROID) 75 MCG Tab Take 75 mcg by mouth every morning on an " "empty stomach. 5 days per week      levothyroxine (SYNTHROID) 88 MCG Tab Take 88 mcg by mouth every morning on an empty stomach. 2 days per week      valsartan (DIOVAN) 80 MG Tab Take 1 Tablet by mouth 2 times a day. 180 Tablet 3     No current facility-administered medications for this visit.       REVIEW OF SYSTEMS  Constitutional: Denies fevers, Denies weight changes  Ears/Nose/Throat/Mouth: Denies nasal congestion or sore throat   Cardiovascular: Denies chest pain  Respiratory: Denies shortness of breath, Denies cough  Gastrointestinal/Hepatic: Denies nausea, vomiting  Sleep: see HPI    Physical Examination:  Vitals/ General Appearance:   Weight/BMI: Body mass index is 34.68 kg/m².  BP (!) 142/84 (BP Location: Left arm, Patient Position: Sitting, BP Cuff Size: Adult)   Pulse 68   Resp 16   Ht 1.651 m (5' 5\")   Wt 94.5 kg (208 lb 6.4 oz)   SpO2 96%   Vitals:    04/30/24 1235   BP: (!) 142/84   BP Location: Left arm   Patient Position: Sitting   BP Cuff Size: Adult   Pulse: 68   Resp: 16   SpO2: 96%   Weight: 94.5 kg (208 lb 6.4 oz)   Height: 1.651 m (5' 5\")       Pt. is alert and oriented to time, place and person. Cooperative and in no apparent distress.     Constitutional: Alert, no distress, well-groomed.  Skin: No rashes in visible areas.  Eye: Round. Conjunctiva clear, lids normal. No icterus.   ENT EXAM  Nasal alae/valves collapsible: No   Nasal septum deviation: Yes  Nasal turbinate hypertrophy: Left: Grade 1   Right: Grade 1  Hard palate narrow: No   Hard palate high: No   Soft palate/uvula (Mallampati score): 4  Tongue Scalloping: Yes  Retrognathia: No   Micrognathia: No   Cardiovascular:no murmus/gallops/rubs, normal S1 and S2 heart sounds, regular rate and rhythm  Pulmonary:Clear to auscultation, No wheezes, No crackles.  Neurologic:Awake, alert and oriented x 3, Normal age appropriate gait, No involuntary motions.  Extremities: No clubbing, cyanosis, or edema       ASSESSMENT AND PLAN   Kaleigh " Mila Figueroa is a 59 y.o. female with hypothyroidism, hypertension, obesity, snoring, and unrefreshing sleep.  Presents to Sleep Clinic for evaluation of sleep.    Sleep disordered breathing  Kaleigh Figueroa  has symptoms of Obstructive Sleep Apnea (APOORVA). Kaleigh Figueroa has symptoms of snoring, teeth grinding, dry mouth, unrefreshed upon awakening, fatigue. These can interfere with activities of daily living.     Pt has risk factors for APOORVA include obesity, age, and crowded oropharynx.     The pathophysiology of APOORVA and the increased risk of cardiovascular morbidity from untreated APOORVA is discussed in detail with the patient. She  also has HTN, hypothyroidism,  which can be worsened by APOORVA.      We have discussed diagnostic options including in-laboratory, attended polysomnography and home sleep testing. We have also discussed treatment options including airway pressurization, reconstructive otolaryngologic surgery, dental appliances and weight management.     Subsequently,treatment options will be discussed based on the diagnostic study. Meanwhile, She is urged to avoid supine sleep, weight gain and alcoholic beverages since all of these can worsen APOORVA. She is cautioned against drowsy driving. If She feels sleepy while driving, advised must pull over for a break/nap, rather than persist on the road, in the interest of Pt's own safety and that of others on the road.    Plan  -  She  will be scheduled for an overnight  HST to assess sleep related breathing disorder.  - Follow up 1-2 weeks after sleep study to discuss results and treatment options moving forward   -Advised to reach out via MyChart or by phone with any questions or concerns.       Please note portions of this record was created using voice recognition software. I have made every reasonable attempt to correct obvious errors, but I expect that there are errors of grammar and possibly content I did not discover before finalizing the note.

## 2024-05-20 ENCOUNTER — TELEPHONE (OUTPATIENT)
Dept: HEALTH INFORMATION MANAGEMENT | Facility: OTHER | Age: 59
End: 2024-05-20
Payer: COMMERCIAL

## 2024-05-22 ENCOUNTER — APPOINTMENT (OUTPATIENT)
Dept: RADIOLOGY | Facility: MEDICAL CENTER | Age: 59
End: 2024-05-22
Attending: NURSE PRACTITIONER
Payer: COMMERCIAL

## 2024-05-22 DIAGNOSIS — M25.561 RIGHT KNEE PAIN, UNSPECIFIED CHRONICITY: ICD-10-CM

## 2024-05-23 ENCOUNTER — HOSPITAL ENCOUNTER (OUTPATIENT)
Dept: LAB | Facility: MEDICAL CENTER | Age: 59
End: 2024-05-23
Attending: NURSE PRACTITIONER
Payer: COMMERCIAL

## 2024-05-23 LAB
ALBUMIN SERPL BCP-MCNC: 4 G/DL (ref 3.2–4.9)
ALBUMIN/GLOB SERPL: 1.1 G/DL
ALP SERPL-CCNC: 138 U/L (ref 30–99)
ALT SERPL-CCNC: 20 U/L (ref 2–50)
ANION GAP SERPL CALC-SCNC: 10 MMOL/L (ref 7–16)
AST SERPL-CCNC: 25 U/L (ref 12–45)
BILIRUB SERPL-MCNC: 0.6 MG/DL (ref 0.1–1.5)
BUN SERPL-MCNC: 14 MG/DL (ref 8–22)
CALCIUM ALBUM COR SERPL-MCNC: 9.3 MG/DL (ref 8.5–10.5)
CALCIUM SERPL-MCNC: 9.3 MG/DL (ref 8.5–10.5)
CHLORIDE SERPL-SCNC: 105 MMOL/L (ref 96–112)
CHOLEST SERPL-MCNC: 180 MG/DL (ref 100–199)
CO2 SERPL-SCNC: 25 MMOL/L (ref 20–33)
CREAT SERPL-MCNC: 0.9 MG/DL (ref 0.5–1.4)
GFR SERPLBLD CREATININE-BSD FMLA CKD-EPI: 73 ML/MIN/1.73 M 2
GLOBULIN SER CALC-MCNC: 3.5 G/DL (ref 1.9–3.5)
GLUCOSE SERPL-MCNC: 84 MG/DL (ref 65–99)
HDLC SERPL-MCNC: 54 MG/DL
LDLC SERPL CALC-MCNC: 114 MG/DL
POTASSIUM SERPL-SCNC: 4.6 MMOL/L (ref 3.6–5.5)
PROT SERPL-MCNC: 7.5 G/DL (ref 6–8.2)
SODIUM SERPL-SCNC: 140 MMOL/L (ref 135–145)
TRIGL SERPL-MCNC: 59 MG/DL (ref 0–149)

## 2024-08-16 ENCOUNTER — APPOINTMENT (OUTPATIENT)
Dept: SLEEP MEDICINE | Facility: MEDICAL CENTER | Age: 59
End: 2024-08-16
Attending: STUDENT IN AN ORGANIZED HEALTH CARE EDUCATION/TRAINING PROGRAM
Payer: COMMERCIAL

## 2024-08-29 ENCOUNTER — APPOINTMENT (OUTPATIENT)
Dept: SLEEP MEDICINE | Facility: MEDICAL CENTER | Age: 59
End: 2024-08-29
Attending: NURSE PRACTITIONER
Payer: COMMERCIAL

## 2024-08-30 ENCOUNTER — APPOINTMENT (OUTPATIENT)
Dept: SLEEP MEDICINE | Facility: MEDICAL CENTER | Age: 59
End: 2024-08-30
Attending: STUDENT IN AN ORGANIZED HEALTH CARE EDUCATION/TRAINING PROGRAM
Payer: COMMERCIAL

## 2024-08-30 DIAGNOSIS — R53.82 CHRONIC FATIGUE: ICD-10-CM

## 2024-08-30 DIAGNOSIS — G47.30 SLEEP DISORDER BREATHING: ICD-10-CM

## 2024-08-30 DIAGNOSIS — I10 ESSENTIAL HYPERTENSION, BENIGN: ICD-10-CM

## 2024-08-30 DIAGNOSIS — R06.83 SNORES: ICD-10-CM

## 2024-08-30 PROCEDURE — 95800 SLP STDY UNATTENDED: CPT | Performed by: STUDENT IN AN ORGANIZED HEALTH CARE EDUCATION/TRAINING PROGRAM

## 2024-09-06 NOTE — PROCEDURES
DIAGNOSTIC HOME SLEEP TEST (HST) REPORT WatchPAT      PATIENT ID:  NAME:  Kaleigh Figueroa  MRN:               [id number]  YOB: 1965  DATE OF STUDY: 9/2/2024      Impression:     This study shows evidence of:      1. Moderate obstructive sleep apnea with PAT apnea hypopnea index(pAHI) of 21.2 per hour.  PAT respiratory disturbance index (pRDI) was 21.9 per hour. These findings are based on 7 channels recording of PAT signal with sleep staging, heart rate, pulse oximetry, actigraphy, body position, snoring and respiratory movement.     2. Oxygenation O2 Sat. mean O2 sat was 92%,  jen was 83%,  and maximum O2 at 98 %. O2 sat was at or  below 88% for 1.4 min of evaluation time. Oxygen Desaturation (>=4%) Index was 7.6/hr. AVG HR was 71 BPM.      TECHNICAL DESCRIPTION: Patient underwent home sleep apnea testing with peripheral arterial tone signal (WatchPAT™). This is a Type IV portable monitor and device per Medicare. Monitoring was done with 7 channels recording of PAT signal with sleep staging, heart rate, pulse oximetry, actigraphy, body position, snoring and respiratory movement. Prior to using the device, the patient received verbal and written instructions for its application and was provided with the help desk phone number for additional telephonic instruction with 24-hour availability of qualified personnel to answer questions.    Respiratory events:      General sleep summary: . Total recording time is 10 hours and 4 minutes and total Sleep time is 8 hours and 25 minutes. The patient spent 177 minutes in the supine position and 330 minutes in the nonsupine position.      Recommendations:    1. CPAP titration study vs Auto CPAP trial.   2. In general patients with sleep apnea are advised to avoid alcohol and sedatives and to not operate a motor vehicle while drowsy. In some cases alternative treatment options may prove effective in resolving sleep apnea in these options include upper  airway surgery, the use of a dental orthotic or weight loss and positional therapy. Clinical correlation is required.         Dayne Castano MD

## 2024-09-20 ENCOUNTER — OFFICE VISIT (OUTPATIENT)
Dept: SLEEP MEDICINE | Facility: MEDICAL CENTER | Age: 59
End: 2024-09-20
Payer: COMMERCIAL

## 2024-09-20 VITALS
BODY MASS INDEX: 35.17 KG/M2 | WEIGHT: 206 LBS | HEIGHT: 64 IN | HEART RATE: 63 BPM | OXYGEN SATURATION: 95 % | DIASTOLIC BLOOD PRESSURE: 80 MMHG | SYSTOLIC BLOOD PRESSURE: 130 MMHG | RESPIRATION RATE: 14 BRPM

## 2024-09-20 DIAGNOSIS — G47.33 OSA ON CPAP: ICD-10-CM

## 2024-09-20 PROCEDURE — 99212 OFFICE O/P EST SF 10 MIN: CPT

## 2024-09-20 PROCEDURE — 99213 OFFICE O/P EST LOW 20 MIN: CPT

## 2024-09-20 PROCEDURE — 3079F DIAST BP 80-89 MM HG: CPT

## 2024-09-20 PROCEDURE — 3075F SYST BP GE 130 - 139MM HG: CPT

## 2024-09-20 ASSESSMENT — FIBROSIS 4 INDEX: FIB4 SCORE: 1.863389981249824747

## 2024-09-20 NOTE — PATIENT INSTRUCTIONS
Please make sure that you are seen within 90 days of receiving your machine, with at least 30 days of use.  Please call scheduling at [phone] if your appointment needs to be moved to meet these parameters.     To meet compliance requirements for insurance please ensure that you use the machine at least 6/7 days a week for at least 4 or more hours a night.    I advise patients to research different mask options before picking up the new machine.  You should also ask what the [employer] company's return policy is for the mask because if you do not like the mask and don't return it within that time frame, you will have to wait 6 months for insurance to cover another mask.     Please bring your entire machine and chip to your first appointment, regardless if the machine is set up for wireless access.    Please do not return the machine without discussing any issues with a sleep provider, as you would be forfeiting therapy and would have to restart the testing process over.

## 2024-09-20 NOTE — ASSESSMENT & PLAN NOTE
Sleep Apnea:    The pathophysiology of sleep anea and the increased risk of cardiovascular morbidity from untreated sleep apnea is discussed in detail with the patient.  Urged to avoid supine sleep, weight gain and alcoholic beverages since all of these can worsen sleep apnea. Cautioned against drowsy driving. If feeling sleepy while driving, pull over for a break/nap, rather than persist on the road, in the interest of own safety and that of others on the road.  The risks of untreated sleep apnea were discussed with the patient at length. Patients with sleep apnea are at increased risk of cardiovascular disease including coronary artery disease, systemic arterial hypertension, pulmonary arterial hypertension, cardiac arrythmias, and stroke.  Positive airway pressure will favorably impact many of the adverse conditions and effects provoked by sleep apnea.    Plan:    HSS was reviewed and discussed with the patient.  We spent significant amount of time today defining sleep apnea, risk of untreated sleep apnea, different treatment options, process of obtaining a CPAP machine, and follow-up parameters.    - Order placed for AutoCPAP 5-15 CWP to [facility]  - Compliance was reinforced  - Clean supplies a least once a week with dish soap and water and air dry  - Recommended the patient against the use of Ozone , such as SoClean  - Recommended the patient change out supplies as recommended for best mask fit and usage of the machine  - Equipment replacement schedule:  Mask cushion every month  Nasal pillows 2 times per month  Mask every 6 months  Head gear every 6 months  Tubing every 3 months  Ultra-fine filters 2 times per month  Foam filter every 6 months  Humidifier chamber every 6 months  Chin strap every 6 months    Has been advised to start CPAP therapy, clean equipment frequently, and get new mask and supplies as allowed by insurance and DME. Recommend an earlier appointment, if significant treatment barriers  develop.    Advised patient to reach out via [employer] if any questions or concerns should arise.

## 2024-09-20 NOTE — PROGRESS NOTES
[facility] Follow-up Visit    Date of Visit: 9/20/2024     CC:  Follow-up for APOORVA management      HPI:  Kaleigh Figueroa is a very pleasant 59 y.o. year old female never smoker, with a PMHx of APOORVA, elevated BMI, HTN who presented to the Sleep Clinic for a regular follow up. Last seen in the office on 4/30/2024 with Dr. Castano.     Patient presents for sleep study results.  Patient states that she will have morning headaches, daytime drowsiness, snoring, and dry mouth.  Patient will sleep on average 4 to 6 hours and awaken once to go to the bathroom but will not have any issues when back to sleep.  She will occasionally nap twice a week for 15 minutes.      Sleep History:  HSS 8/30/2024-        Patient Active Problem List    Diagnosis Date Noted    APOORVA on CPAP 09/20/2024    Class 1 obesity due to excess calories without serious comorbidity with body mass index (BMI) of 34.0 to 34.9 in adult 08/23/2023    Chronic fatigue 08/23/2023    JACOB (dyspnea on exertion) 08/23/2023    Essential hypertension, benign 02/24/2023    Counseling on health promotion and disease prevention 02/24/2023    Dyslipidemia 02/24/2023    Diffuse ST segment depression 02/24/2023    Other forms of angina pectoris (HCC) 02/24/2023    Dog bite 10/10/2019    Wound infection 07/11/2019    Adjustment disorder with depressed mood 07/11/2019    Vitamin D insufficiency 10/15/2018    Acute folliculitis 06/05/2018    Closed fracture of phalanx of right fifth toe with routine healing 06/05/2018    Osteopenia 03/31/2017    Acute bilateral low back pain without sciatica 12/23/2016    Postsurgical hypothyroidism 10/27/2016    Adjustment disorder with mixed anxiety and depressed mood 10/27/2016    Hot flashes 10/27/2016     Past Medical History:   Diagnosis Date    Insomnia     Thyroid disease       Past Surgical History:   Procedure Laterality Date    THYROIDECTOMY  2003     Family History   Problem Relation Age of Onset    Heart Disease Mother          Pacemaker 70's    Anxiety disorder Mother     Cancer Father     Thyroid Father         Hashimotos    Diabetes Maternal Grandmother     Heart Disease Maternal Grandfather     Diabetes Paternal Grandmother     Heart Disease Paternal Grandfather      Social History     Socioeconomic History    Marital status:      Spouse name: Not on file    Number of children: Not on file    Years of education: Not on file    Highest education level: Not on file   Occupational History    Not on file   Tobacco Use    Smoking status: Never    Smokeless tobacco: Never   Vaping Use    Vaping status: Not on file   Substance and Sexual Activity    Alcohol use: Not Currently     Comment: socc    Drug use: No    Sexual activity: Never   Other Topics Concern    Not on file   Social History Narrative    Not on file     Social Determinants of Health     Financial Resource Strain: Low Risk  (11/7/2023)    Received from [facility]    Overall Financial Resource Strain (CARDIA)     Difficulty of Paying Living Expenses: Not hard at all   Food Insecurity: No Food Insecurity (11/7/2023)    Received from [facility]    Hunger Vital Sign     Worried About Running Out of Food in the Last Year: Never true     Ran Out of Food in the Last Year: Never true   Transportation Needs: No Transportation Needs (11/7/2023)    Received from [facility]    PRAPARE - Transportation     Lack of Transportation (Medical): No     Lack of Transportation (Non-Medical): No   Physical Activity: Insufficiently Active (11/7/2023)    [facility]    Exercise Vital Sign     Days of Exercise per Week: 2 days     Minutes of Exercise per Session: 50 min   Stress: Stress Concern Present (11/7/2023)    Received from [facility]    [employer] [place] [employer] - Occupational Stress Questionnaire     Feeling of Stress : To some extent    Social Connections: Unknown (11/7/2023)    Received from [facility]    Social Connection and Isolation Panel [NHANES]     Frequency of Communication with Friends and Family: More than three times a week     Frequency of Social Gatherings with Friends and Family: Twice a week     Attends Religion Services: Patient declined     Active Member of Clubs or Organizations: Patient declined     Attends Club or Organization Meetings: Patient declined     Marital Status: Living with partner   Intimate Partner Violence: Not At Risk (11/7/2023)    Received from [facility]    Humiliation, Afraid, Rape, and Kick questionnaire     Fear of Current or Ex-Partner: No     Emotionally Abused: No     Physically Abused: No     Sexually Abused: No   Housing Stability: Low Risk  (11/7/2023)    Received from [facility]    Housing Stability Vital Sign     Unable to Pay for Housing in the Last Year: No     Number of Places Lived in the Last Year: 1     Unstable Housing in the Last Year: No     Current Outpatient Medications   Medication Sig Dispense Refill    levothyroxine (SYNTHROID) 75 MCG Tab Take 75 mcg by mouth every morning on an empty stomach. 5 days per week      levothyroxine (SYNTHROID) 88 MCG Tab Take 88 mcg by mouth every morning on an empty stomach. 2 days per week      valsartan (DIOVAN) 80 MG Tab Take 1 Tablet by mouth 2 times a day. 180 Tablet 3     No current facility-administered medications for this visit.      ALLERGIES: Codeine, Amlodipine, and Vicodin [hydrocodone-acetaminophen]    ROS:  Constitutional: Denies fever, chills, sweats,  weight loss, fatigue  Cardiovascular: Denies chest pain, tightness, palpitations, swelling in legs/feet  Respiratory: Denies shortness of breath, cough, sputum, wheezing, painful breathing   Sleep: per HPI  Gastrointestinal: Denies  difficulty swallowing, nausea, abdominal pain, diarrhea, constipation,  "heartburn.  Musculoskeletal: Denies painful joints, sore muscles,       PHYSICAL EXAM:  /80 (BP Location: Right arm, Patient Position: Sitting, BP Cuff Size: Adult)   Pulse 63   Resp 14   Ht 1.626 m (5' 4\")   Wt 93.4 kg (206 lb)   LMP 02/01/2016   SpO2 95%   BMI 35.36 kg/m²   Appearance: Well-nourished, well-developed, no acute distress  Eyes:  No scleral icterus , EOMI  ENMT: No redness of the oropharynx  Lung auscultation:  No wheezes rhonchi rubs or rales  Cardiac: No murmurs, rubs, or gallops; regular rhythm, normal rate; no edema  Musculoskeletal:  Grossly normal; gait and station normal; digits and nails normal  Skin:  No rashes, petechiae, cyanosis  Neurologic: without focal signs; oriented to person, time, place, and purpose; judgement intact  Psychiatric:  No depression, anxiety, agitation  Mallampati score: Class II    Assessment and Plan:    The medical record was reviewed.    Diagnostic and titration nocturnal polysomnogram's, home sleep apnea tests, continuous nocturnal oximetry results, multiple sleep latency tests, and compliance reports reviewed.    Problem List Items Addressed This Visit          Pulmonary/Sleep Medicine Problems    APOORVA on CPAP     Sleep Apnea:    The pathophysiology of sleep anea and the increased risk of cardiovascular morbidity from untreated sleep apnea is discussed in detail with the patient.  Urged to avoid supine sleep, weight gain and alcoholic beverages since all of these can worsen sleep apnea. Cautioned against drowsy driving. If feeling sleepy while driving, pull over for a break/nap, rather than persist on the road, in the interest of own safety and that of others on the road.  The risks of untreated sleep apnea were discussed with the patient at length. Patients with sleep apnea are at increased risk of cardiovascular disease including coronary artery disease, systemic arterial hypertension, pulmonary arterial hypertension, cardiac arrythmias, and stroke.  " Positive airway pressure will favorably impact many of the adverse conditions and effects provoked by sleep apnea.    Plan:    HSS was reviewed and discussed with the patient.  We spent significant amount of time today defining sleep apnea, risk of untreated sleep apnea, different treatment options, process of obtaining a CPAP machine, and follow-up parameters.    - Order placed for AutoCPAP 5-15 CWP to [facility]  - Compliance was reinforced  - Clean supplies a least once a week with dish soap and water and air dry  - Recommended the patient against the use of Ozone , such as SoClean  - Recommended the patient change out supplies as recommended for best mask fit and usage of the machine  - Equipment replacement schedule:  Mask cushion every month  Nasal pillows 2 times per month  Mask every 6 months  Head gear every 6 months  Tubing every 3 months  Ultra-fine filters 2 times per month  Foam filter every 6 months  Humidifier chamber every 6 months  Chin strap every 6 months    Has been advised to start CPAP therapy, clean equipment frequently, and get new mask and supplies as allowed by insurance and DME. Recommend an earlier appointment, if significant treatment barriers develop.    Advised patient to reach out via [employer] if any questions or concerns should arise.              Relevant Orders    DME CPAP     Have advised the patient to follow up with the appropriate healthcare practitioners for all other medical problems and issues.    Return in about 3 months (around 12/20/2024), or if symptoms worsen or fail to improve, for 1st compliance, with [place].    Please note portions of this record was created using voice recognition software. I have made every reasonable attempt to correct obvious errors, but I expect that there are errors of grammar and possibly content I did not discover before finalizing the note.    Time spent in record review prior to patient arrival, reviewing results, and in face-to-face  encounter totaled 20 min.  __________  HAILEY Tam  Pulmonary & Sleep Medicine  [facility]

## 2024-10-30 ENCOUNTER — HOSPITAL ENCOUNTER (OUTPATIENT)
Dept: LAB | Facility: MEDICAL CENTER | Age: 59
End: 2024-10-30
Attending: INTERNAL MEDICINE
Payer: COMMERCIAL

## 2024-10-31 ENCOUNTER — HOSPITAL ENCOUNTER (OUTPATIENT)
Dept: LAB | Facility: MEDICAL CENTER | Age: 59
End: 2024-10-31
Attending: STUDENT IN AN ORGANIZED HEALTH CARE EDUCATION/TRAINING PROGRAM
Payer: COMMERCIAL

## 2024-10-31 LAB
T4 FREE SERPL-MCNC: 1.77 NG/DL (ref 0.93–1.7)
TSH SERPL-ACNC: 1.4 UIU/ML (ref 0.35–5.5)

## 2024-10-31 PROCEDURE — 84244 ASSAY OF RENIN: CPT

## 2024-10-31 PROCEDURE — 84439 ASSAY OF FREE THYROXINE: CPT

## 2024-10-31 PROCEDURE — 83835 ASSAY OF METANEPHRINES: CPT

## 2024-10-31 PROCEDURE — 84443 ASSAY THYROID STIM HORMONE: CPT

## 2024-10-31 PROCEDURE — 82088 ASSAY OF ALDOSTERONE: CPT

## 2024-10-31 PROCEDURE — 36415 COLL VENOUS BLD VENIPUNCTURE: CPT

## 2024-11-02 LAB — ALDOST SERPL-MCNC: 7.9 NG/DL

## 2024-11-04 LAB
METANEPHS SERPL-SCNC: 0.15 NMOL/L (ref 0–0.49)
MISCELLANEOUS LAB RESULT MISCLAB: NORMAL
NORMETANEPHRINE SERPL-SCNC: 0.44 NMOL/L (ref 0–0.89)

## 2024-12-31 ENCOUNTER — APPOINTMENT (OUTPATIENT)
Dept: SLEEP MEDICINE | Facility: MEDICAL CENTER | Age: 59
End: 2024-12-31
Attending: STUDENT IN AN ORGANIZED HEALTH CARE EDUCATION/TRAINING PROGRAM
Payer: COMMERCIAL

## 2025-03-01 ENCOUNTER — OFFICE VISIT (OUTPATIENT)
Dept: URGENT CARE | Facility: PHYSICIAN GROUP | Age: 60
End: 2025-03-01
Payer: COMMERCIAL

## 2025-03-01 VITALS
HEART RATE: 74 BPM | BODY MASS INDEX: 35.92 KG/M2 | OXYGEN SATURATION: 97 % | RESPIRATION RATE: 15 BRPM | WEIGHT: 215.6 LBS | DIASTOLIC BLOOD PRESSURE: 92 MMHG | SYSTOLIC BLOOD PRESSURE: 130 MMHG | TEMPERATURE: 98.2 F | HEIGHT: 65 IN

## 2025-03-01 DIAGNOSIS — L02.411 ABSCESS OF RIGHT AXILLA: ICD-10-CM

## 2025-03-01 PROCEDURE — 3075F SYST BP GE 130 - 139MM HG: CPT

## 2025-03-01 PROCEDURE — 99213 OFFICE O/P EST LOW 20 MIN: CPT

## 2025-03-01 PROCEDURE — 3080F DIAST BP >= 90 MM HG: CPT

## 2025-03-01 RX ORDER — SULFAMETHOXAZOLE AND TRIMETHOPRIM 800; 160 MG/1; MG/1
1 TABLET ORAL 2 TIMES DAILY
Qty: 10 TABLET | Refills: 0 | Status: SHIPPED | OUTPATIENT
Start: 2025-03-01 | End: 2025-03-06

## 2025-03-01 ASSESSMENT — ENCOUNTER SYMPTOMS
DOUBLE VISION: 0
FEVER: 0
COUGH: 0
SHORTNESS OF BREATH: 0
DIZZINESS: 0
HEADACHES: 0
BLURRED VISION: 0
VOMITING: 0
PALPITATIONS: 0
DIARRHEA: 0
CHILLS: 0
SORE THROAT: 0
MYALGIAS: 0
WEAKNESS: 0
NAUSEA: 0
TINGLING: 0

## 2025-03-01 ASSESSMENT — FIBROSIS 4 INDEX: FIB4 SCORE: 1.863389981249824747

## 2025-03-01 NOTE — PROGRESS NOTES
Subjective:   Kaleigh Figueroa is a 59 y.o. female who presents for Other (C/o ingrown hair in R armpit, bump, red w/ white spot, had pus but no longer, pea-sized bump grew x2 in size, x noticed around 02/07.)    Patient presents to the clinic for complaints of a bump in her right armpit x 3 weeks.  Bump has gotten more red and tender over time. Bump drained clear fluid this morning after a shower.  Has been doing warm compresses, which has helped.   Patient denies fever, chills, chest pain, SOB, rash, hives, or enlarged lymph nodes.     Other  Pertinent negatives include no chest pain, chills, congestion, coughing, fever, headaches, myalgias, nausea, sore throat, vomiting or weakness.       Review of Systems   Constitutional:  Negative for chills and fever.   HENT:  Negative for congestion, ear pain and sore throat.    Eyes:  Negative for blurred vision and double vision.   Respiratory:  Negative for cough and shortness of breath.    Cardiovascular:  Negative for chest pain and palpitations.   Gastrointestinal:  Negative for diarrhea, nausea and vomiting.   Genitourinary:  Negative for dysuria.   Musculoskeletal:  Negative for myalgias.   Skin:         Bump in right axilla.   Neurological:  Negative for dizziness, tingling, weakness and headaches.   All other systems reviewed and are negative.    Refer to HPI for additional details.    During this visit, appropriate PPE was worn, and hand hygiene was performed.    PMH:  has a past medical history of Insomnia and Thyroid disease.    MEDS:   Current Outpatient Medications:     sulfamethoxazole-trimethoprim (BACTRIM DS) 800-160 MG tablet, Take 1 Tablet by mouth 2 times a day for 5 days., Disp: 10 Tablet, Rfl: 0    levothyroxine (SYNTHROID) 75 MCG Tab, Take 75 mcg by mouth every morning on an empty stomach. 5 days per week, Disp: , Rfl:     levothyroxine (SYNTHROID) 88 MCG Tab, Take 88 mcg by mouth every morning on an empty stomach. 2 days per week, Disp: , Rfl:      "valsartan (DIOVAN) 80 MG Tab, Take 1 Tablet by mouth 2 times a day., Disp: 180 Tablet, Rfl: 3    ALLERGIES:   Allergies   Allergen Reactions    Codeine Itching    Amlodipine Swelling     Nausea, dizziness, ELIER    Vicodin [Hydrocodone-Acetaminophen] Itching and Anxiety     SURGHX:   Past Surgical History:   Procedure Laterality Date    THYROIDECTOMY  2003     SOCHX:  reports that she has never smoked. She has never used smokeless tobacco. She reports that she does not currently use alcohol. She reports that she does not use drugs.    FH: Per HPI as applicable/pertinent.    Medications, Allergies, and current problem list reviewed today in Epic.     Objective:     BP (!) 130/92 (BP Location: Left arm, Patient Position: Sitting, BP Cuff Size: Adult long)   Pulse 74   Temp 36.8 °C (98.2 °F) (Temporal)   Resp 15   Ht 1.638 m (5' 4.5\")   Wt 97.8 kg (215 lb 9.6 oz)   SpO2 97%     Physical Exam  Constitutional:       Appearance: Normal appearance. She is not ill-appearing or toxic-appearing.   HENT:      Head: Normocephalic.      Right Ear: External ear normal.      Left Ear: External ear normal.      Nose: Nose normal. No congestion or rhinorrhea.      Mouth/Throat:      Mouth: Mucous membranes are moist.   Eyes:      General:         Right eye: No discharge.         Left eye: No discharge.      Extraocular Movements: Extraocular movements intact.      Conjunctiva/sclera: Conjunctivae normal.   Cardiovascular:      Rate and Rhythm: Normal rate and regular rhythm.      Pulses: Normal pulses.      Heart sounds: Normal heart sounds. No murmur heard.  Pulmonary:      Effort: Pulmonary effort is normal. No respiratory distress.      Breath sounds: Normal breath sounds. No wheezing or rhonchi.   Abdominal:      General: Abdomen is flat.   Musculoskeletal:         General: No signs of injury. Normal range of motion.      Cervical back: Normal range of motion. No rigidity.   Lymphadenopathy:      Cervical: No cervical " adenopathy.   Skin:     General: Skin is warm and dry.      Comments: Red, fluid-filled vesicle that is surface level consistent w/ surface-level abscess on the medial part of the right axilla. Negative for breaks in the skin, induration, bleeding or drainage.    Neurological:      General: No focal deficit present.      Mental Status: She is alert and oriented to person, place, and time.      Motor: No weakness.       Assessment/Plan:     Diagnosis and associated orders:     1. Abscess of right axilla  - sulfamethoxazole-trimethoprim (BACTRIM DS) 800-160 MG tablet; Take 1 Tablet by mouth 2 times a day for 5 days.  Dispense: 10 Tablet; Refill: 0     Comments/MDM:     Discussed that likely etiology of the patient's symptoms is surface level abscess of the right axilla.  More vesicular/pustular then deep skin infection.  No indication for incision and drainage at this time.  Bactrim prescribed for the patient.Discussed proper administration and dosing as well as associated adverse/side effects of prescribed medications.  Advised patient to continue OTC pharmacologic and nonpharmacologic treatment of her symptoms, including but not limited to Tylenol, Motrin, skin protection and hygiene, and plenty of rest and fluids.  Instructed patient to follow-up after completion of antibiotics if symptoms do not improve.  Patient agreeable to this plan of care.  All questions answered.  Return to clinic if symptoms persist or worsen.  Red flag symptoms warranting emergency medical services discussed, including but not limited to chest pain, SOB, wheezing, difficulty breathing or swallowing, sensation of throat closing or mass in the throat, severe intractable headache, changes to vision or hearing, palpitations or racing heart rate, abdominal pain, fever greater than 103F despite medication management, dizziness/lightheadedness/vertigo, or nausea/vomiting/diarrhea.           Differential diagnosis, natural history, supportive  care, and indications for immediate follow-up discussed.    Advised the patient to follow-up with the primary care physician for recheck, reevaluation, and consideration of further management.    Instructed patient to seek emergency medical attention via calling EMS or going to the Emergency Room for red flag symptoms, including but not limited to: chest pain, palpitations, fever greater than 103F, shortness of breath, wheezing, new or worsened numbness/tingling, focal or unilateral weakness, and bloody vomit/stool.     Please note that this dictation was created using voice recognition software. I have made a reasonable attempt to correct obvious errors, but I expect that there are errors of grammar and possibly content that I did not discover before finalizing the note.    This note was electronically signed by PHILLIP Givens

## 2025-03-03 ENCOUNTER — HOSPITAL ENCOUNTER (OUTPATIENT)
Facility: MEDICAL CENTER | Age: 60
End: 2025-03-03
Attending: PHYSICIAN ASSISTANT
Payer: COMMERCIAL

## 2025-03-03 ENCOUNTER — OFFICE VISIT (OUTPATIENT)
Dept: URGENT CARE | Facility: PHYSICIAN GROUP | Age: 60
End: 2025-03-03
Payer: COMMERCIAL

## 2025-03-03 VITALS
TEMPERATURE: 96.8 F | HEART RATE: 80 BPM | WEIGHT: 212 LBS | RESPIRATION RATE: 18 BRPM | SYSTOLIC BLOOD PRESSURE: 140 MMHG | BODY MASS INDEX: 36.19 KG/M2 | HEIGHT: 64 IN | OXYGEN SATURATION: 97 % | DIASTOLIC BLOOD PRESSURE: 90 MMHG

## 2025-03-03 DIAGNOSIS — L02.411 ABSCESS OF RIGHT AXILLA: ICD-10-CM

## 2025-03-03 PROCEDURE — 3077F SYST BP >= 140 MM HG: CPT | Performed by: PHYSICIAN ASSISTANT

## 2025-03-03 PROCEDURE — 99213 OFFICE O/P EST LOW 20 MIN: CPT | Performed by: PHYSICIAN ASSISTANT

## 2025-03-03 PROCEDURE — 3080F DIAST BP >= 90 MM HG: CPT | Performed by: PHYSICIAN ASSISTANT

## 2025-03-03 PROCEDURE — 87070 CULTURE OTHR SPECIMN AEROBIC: CPT

## 2025-03-03 PROCEDURE — 87205 SMEAR GRAM STAIN: CPT

## 2025-03-03 PROCEDURE — 87186 SC STD MICRODIL/AGAR DIL: CPT

## 2025-03-03 PROCEDURE — 87077 CULTURE AEROBIC IDENTIFY: CPT

## 2025-03-03 ASSESSMENT — FIBROSIS 4 INDEX: FIB4 SCORE: 1.863389981249824747

## 2025-03-03 ASSESSMENT — ENCOUNTER SYMPTOMS: FEVER: 0

## 2025-03-03 NOTE — PROGRESS NOTES
"  Subjective:     Kaleigh Figueroa  is a 59 y.o. female who presents for Cyst (Seen at  on 3/1 for cyst of right axilla, was instructed to return if it burst. Cyst burst this AM, blood and pus discharged. Pt began antibiotic this AM.)       She presents today in follow-up for reevaluation after being seen on 3/1/2025 for cyst/abscess in the right axilla.  She was instructed to return if it does rupture, which it has this morning.  Spontaneously ruptured while she was in the shower.  Has been draining since that time.  She notes discomfort over the area.  No fevers.  She was prescribed Bactrim at the time of her office visit but was unable to  the prescription until this morning, has only taken 1 dose of the antibiotic thus far.       Review of Systems   Constitutional:  Negative for fever.   Skin:         Ruptured abscess right axilla      Allergies   Allergen Reactions    Codeine Itching    Vicodin [Hydrocodone-Acetaminophen] Itching and Anxiety     Past Medical History:   Diagnosis Date    Insomnia     Thyroid disease         Objective:   BP (!) 140/90 (BP Location: Left arm, Patient Position: Sitting, BP Cuff Size: Adult long)   Pulse 80   Temp 36 °C (96.8 °F) (Temporal)   Resp 18   Ht 1.626 m (5' 4\")   Wt 96.2 kg (212 lb)   LMP 02/01/2016   SpO2 97%   BMI 36.39 kg/m²   Physical Exam  Vitals and nursing note reviewed.   Constitutional:       General: She is not in acute distress.     Appearance: She is not ill-appearing or toxic-appearing.   HENT:      Head: Normocephalic.      Nose: No rhinorrhea.   Eyes:      General: No scleral icterus.     Conjunctiva/sclera: Conjunctivae normal.   Pulmonary:      Effort: Pulmonary effort is normal. No respiratory distress.      Breath sounds: No stridor.   Musculoskeletal:      Cervical back: Neck supple.   Skin:     Comments: Examination of the right axilla does reveal a spontaneously ruptured abscess with blood and purulent drainage from the wound.  " Surrounding skin induration.  Mild tenderness over top of the area.   Neurological:      Mental Status: She is alert and oriented to person, place, and time.   Psychiatric:         Mood and Affect: Mood normal.         Behavior: Behavior normal.         Thought Content: Thought content normal.         Judgment: Judgment normal.             Diagnostic testing:    Wound culture-pending    Assessment/Plan:     Encounter Diagnoses   Name Primary?    Abscess of right axilla          Plan for care for today's complaint includes discussing with patient that due to the spontaneous rupture of the abscess we will irrigate the wound to ensure full purulence removal. Prior to irrigation I did collect a wound culture; will contact the patient via [employer] message to discuss the results of the testing obtained today, will adjust treatment plan accordingly.  Spontaneous rupture likely occurred as patient has only taken 1 dose of antibiotic since her office visit on 3/1/2025, instructed patient to continue Bactrim at this time.  Dressing was placed over top the wound. Wound and dressing care discussed. Continue to monitor symptoms and return to urgent care or follow-up with primary care provider if symptoms remain ongoing.  Follow-up in the emergency department if symptoms become severe, ER precautions discussed in office today.    See AVS Instructions below for written guidance provided to patient on after-visit management and care in addition to our verbal discussion during the visit.    Please note that this dictation was created using voice recognition software. I have attempted to correct all errors, but there may be sound-alike, spelling, grammar and possibly content errors that I did not discover before finalizing the note.    Lester Magdaleno PA-C

## 2025-03-04 LAB
GRAM STN SPEC: NORMAL
SIGNIFICANT IND 70042: NORMAL
SITE SITE: NORMAL
SOURCE SOURCE: NORMAL

## 2025-03-05 LAB
BACTERIA WND AEROBE CULT: ABNORMAL
BACTERIA WND AEROBE CULT: ABNORMAL
GRAM STN SPEC: ABNORMAL
SIGNIFICANT IND 70042: ABNORMAL
SITE SITE: ABNORMAL
SOURCE SOURCE: ABNORMAL

## 2025-03-07 ENCOUNTER — RESULTS FOLLOW-UP (OUTPATIENT)
Dept: URGENT CARE | Facility: PHYSICIAN GROUP | Age: 60
End: 2025-03-07
Payer: COMMERCIAL

## 2025-03-28 ENCOUNTER — OFFICE VISIT (OUTPATIENT)
Dept: URGENT CARE | Facility: PHYSICIAN GROUP | Age: 60
End: 2025-03-28
Payer: COMMERCIAL

## 2025-03-28 VITALS
OXYGEN SATURATION: 96 % | DIASTOLIC BLOOD PRESSURE: 98 MMHG | HEIGHT: 65 IN | TEMPERATURE: 98.2 F | HEART RATE: 74 BPM | SYSTOLIC BLOOD PRESSURE: 154 MMHG | WEIGHT: 216.4 LBS | RESPIRATION RATE: 15 BRPM | BODY MASS INDEX: 36.06 KG/M2

## 2025-03-28 DIAGNOSIS — L02.411 ABSCESS OF RIGHT AXILLA: ICD-10-CM

## 2025-03-28 PROCEDURE — 3077F SYST BP >= 140 MM HG: CPT

## 2025-03-28 PROCEDURE — 3080F DIAST BP >= 90 MM HG: CPT

## 2025-03-28 PROCEDURE — 99213 OFFICE O/P EST LOW 20 MIN: CPT

## 2025-03-28 RX ORDER — SULFAMETHOXAZOLE AND TRIMETHOPRIM 800; 160 MG/1; MG/1
1 TABLET ORAL 2 TIMES DAILY
Qty: 14 TABLET | Refills: 0 | Status: SHIPPED | OUTPATIENT
Start: 2025-03-28 | End: 2025-04-04

## 2025-03-28 ASSESSMENT — FIBROSIS 4 INDEX: FIB4 SCORE: 1.89

## 2025-03-28 ASSESSMENT — ENCOUNTER SYMPTOMS: HEADACHES: 1

## 2025-03-28 NOTE — PROGRESS NOTES
Subjective:   Kaleigh Figueroa is a 60 y.o. female who presents for Cyst (Hx of abscess of right axilla, located in R armpit, same location/ increasing in size, swollen, red, tender to touch, x 3-4 days worsening. ) and Headache (Chronic headaches x 2 weeks./)    Patient presents to the clinic for complaints of cyst in the right armpit x 3 to 4 days that has been swollen, red, tender, and has been increasing in size.  Patient does have history of abscess in the armpits.  Last Axilla abscess was on 3/1 - finished course of Bactrim and was successful at resolving abscess along with warm compresses that let the abscess drain.  Has taken no meds for her current symptoms.   Patient denies chest pain, SOB, dizziness/lightheadedness/vertigo, nausea/vomiting/diarrhea, difficulty breathing or swallowing, palpitations or racing heart rate, fever, chills, numbness and tingling, or weakness.      Cyst  Associated symptoms include headaches.   Headache      Review of Systems   Neurological:  Positive for headaches.     Refer to HPI for additional details.    During this visit, appropriate PPE was worn, and hand hygiene was performed.    PMH:  has a past medical history of Insomnia and Thyroid disease.    MEDS:   Current Outpatient Medications:     sulfamethoxazole-trimethoprim (BACTRIM DS) 800-160 MG tablet, Take 1 Tablet by mouth 2 times a day for 7 days., Disp: 14 Tablet, Rfl: 0    levothyroxine (SYNTHROID) 75 MCG Tab, Take 75 mcg by mouth every morning on an empty stomach. 5 days per week, Disp: , Rfl:     levothyroxine (SYNTHROID) 88 MCG Tab, Take 88 mcg by mouth every morning on an empty stomach. 2 days per week, Disp: , Rfl:     valsartan (DIOVAN) 80 MG Tab, Take 1 Tablet by mouth 2 times a day., Disp: 180 Tablet, Rfl: 3    ALLERGIES:   Allergies   Allergen Reactions    Codeine Itching    Vicodin [Hydrocodone-Acetaminophen] Itching and Anxiety     SURGHX:   Past Surgical History:   Procedure Laterality Date     "THYROIDECTOMY  2003     SOCHX:  reports that she has never smoked. She has never used smokeless tobacco. She reports that she does not currently use alcohol. She reports that she does not use drugs.    FH: Per HPI as applicable/pertinent.    Medications, Allergies, and current problem list reviewed today in Epic.     Objective:     BP (!) 154/98 (BP Location: Left arm, Patient Position: Sitting, BP Cuff Size: Adult long)   Pulse 74   Temp 36.8 °C (98.2 °F) (Temporal)   Resp 15   Ht 1.651 m (5' 5\")   Wt 98.2 kg (216 lb 6.4 oz)   SpO2 96%     Physical Exam  Constitutional:       Appearance: Normal appearance. She is not ill-appearing or toxic-appearing.   HENT:      Head: Normocephalic.      Right Ear: External ear normal.      Left Ear: External ear normal.      Nose: Nose normal. No congestion or rhinorrhea.      Mouth/Throat:      Mouth: Mucous membranes are moist.   Eyes:      General:         Right eye: No discharge.         Left eye: No discharge.      Extraocular Movements: Extraocular movements intact.      Conjunctiva/sclera: Conjunctivae normal.      Pupils: Pupils are equal, round, and reactive to light.   Cardiovascular:      Rate and Rhythm: Normal rate and regular rhythm.      Pulses: Normal pulses.      Heart sounds: Normal heart sounds. No murmur heard.  Pulmonary:      Effort: Pulmonary effort is normal. No respiratory distress.      Breath sounds: Normal breath sounds. No wheezing, rhonchi or rales.   Abdominal:      General: Abdomen is flat.   Musculoskeletal:         General: No signs of injury. Normal range of motion.      Cervical back: Normal range of motion. No rigidity.   Lymphadenopathy:      Cervical: No cervical adenopathy.   Skin:     General: Skin is warm and dry.      Findings: No erythema.      Comments: Small circular area of erythema, mild tenderness, and fluctuance consistent with cyst/abscess in the upper part of the right axilla roughly 1.5 cm in diameter.  Negative for " drainage, dark discoloration, wounds, bleeding, or induration.   Neurological:      General: No focal deficit present.      Mental Status: She is alert and oriented to person, place, and time.      Motor: No weakness.         Assessment/Plan:     Diagnosis and associated orders:     1. Abscess of right axilla  - sulfamethoxazole-trimethoprim (BACTRIM DS) 800-160 MG tablet; Take 1 Tablet by mouth 2 times a day for 7 days.  Dispense: 14 Tablet; Refill: 0     Comments/MDM:     Discussed that likely etiology of the patient's symptoms is small abscess in the right axilla.  Shared decision making with patient regarding treatment course resulted with deferment of I&D at this time as her previous axilla abscess resolved with Bactrim and warm compresses.  Bactrim 1 week course prescribed to the patient. Discussed proper administration and dosing as well as associated adverse/side effects of prescribed medications.  Advised patient to continue OTC pharmacologic and nonpharmacologic treatment of her symptoms, including but not limited to Tylenol, Motrin, proper skin hygiene and cleanliness, warm compresses, and plenty of rest and fluids.  Patient agreeable to this plan of care.  All questions answered.  Return to clinic if symptoms persist or worsen.  Red flag symptoms warranting emergency medical services discussed, including but not limited to chest pain, SOB, wheezing, difficulty breathing or swallowing, sensation of throat closing or mass in the throat, severe intractable headache, changes to vision or hearing, palpitations or racing heart rate, abdominal pain, fever greater than 103F despite medication management, growth or increased tenderness/pain with the abscess/cyst, dizziness/lightheadedness/vertigo, or nausea/vomiting/diarrhea.           Differential diagnosis, natural history, supportive care, and indications for immediate follow-up discussed.    Advised the patient to follow-up with the primary care physician for  recheck, reevaluation, and consideration of further management.    Instructed patient to seek emergency medical attention via calling EMS or going to the Emergency Room for red flag symptoms, including but not limited to: chest pain, palpitations, fever greater than 103F, shortness of breath, wheezing, new or worsened numbness/tingling, focal or unilateral weakness, and bloody vomit/stool.     Please note that this dictation was created using voice recognition software. I have made a reasonable attempt to correct obvious errors, but I expect that there are errors of grammar and possibly content that I did not discover before finalizing the note.    This note was electronically signed by PHILLIP Givens

## 2025-04-02 ENCOUNTER — OFFICE VISIT (OUTPATIENT)
Dept: SLEEP MEDICINE | Facility: MEDICAL CENTER | Age: 60
End: 2025-04-02
Attending: STUDENT IN AN ORGANIZED HEALTH CARE EDUCATION/TRAINING PROGRAM
Payer: COMMERCIAL

## 2025-04-02 VITALS
HEIGHT: 65 IN | BODY MASS INDEX: 35.87 KG/M2 | HEART RATE: 70 BPM | OXYGEN SATURATION: 96 % | RESPIRATION RATE: 16 BRPM | DIASTOLIC BLOOD PRESSURE: 78 MMHG | SYSTOLIC BLOOD PRESSURE: 122 MMHG | WEIGHT: 215.3 LBS

## 2025-04-02 DIAGNOSIS — G47.33 OSA (OBSTRUCTIVE SLEEP APNEA): ICD-10-CM

## 2025-04-02 PROCEDURE — 3074F SYST BP LT 130 MM HG: CPT | Performed by: STUDENT IN AN ORGANIZED HEALTH CARE EDUCATION/TRAINING PROGRAM

## 2025-04-02 PROCEDURE — 3078F DIAST BP <80 MM HG: CPT | Performed by: STUDENT IN AN ORGANIZED HEALTH CARE EDUCATION/TRAINING PROGRAM

## 2025-04-02 PROCEDURE — 99213 OFFICE O/P EST LOW 20 MIN: CPT | Performed by: STUDENT IN AN ORGANIZED HEALTH CARE EDUCATION/TRAINING PROGRAM

## 2025-04-02 PROCEDURE — 99211 OFF/OP EST MAY X REQ PHY/QHP: CPT | Performed by: STUDENT IN AN ORGANIZED HEALTH CARE EDUCATION/TRAINING PROGRAM

## 2025-04-02 ASSESSMENT — FIBROSIS 4 INDEX: FIB4 SCORE: 1.89

## 2025-04-02 NOTE — PROGRESS NOTES
[facility] Follow-up Visit    CC: Follow-up regarding management of obstructive sleep apnea after receiving CPAP machine      HPI:  Kaleigh Figueroa is a 60 y.o.female  with hypothyroidism, hypertension, and obstructive sleep apnea on CPAP.  Presents today to follow-up regarding management of obstructive sleep apnea.    Since her last visit with our office she did receive her CPAP machine.  She did have a death in the family and had to travel abruptly across the country and did not take her machine.  She reports since returning to the area she has been using her machine.  She is unsure why it is not transmitting data.  With using her PAP machine she has noticed benefit.  She feels that she sleeps better and is more rested.  She finds her mask and pressures comfortable.  Has no acute complaints at this time.      DME provider: [facility]   Device: Airsense 10   Mask: hybrid fullface top connecting   Aerophagia: No   Snoring: No   Dry mouth: No   Leak: No   Skin irritation: No   Chin strap: No       Sleep History  9/2/2024 HST WatchPAT study showed moderate obstructive sleep apnea with an overall AHI of 21.2 events an hour, time at or below 88% saturation 1.4 minutes.    Patient Active Problem List    Diagnosis Date Noted    APOORVA on CPAP 09/20/2024    Class 1 obesity due to excess calories without serious comorbidity with body mass index (BMI) of 34.0 to 34.9 in adult 08/23/2023    Chronic fatigue 08/23/2023    JACOB (dyspnea on exertion) 08/23/2023    Essential hypertension, benign 02/24/2023    Counseling on health promotion and disease prevention 02/24/2023    Dyslipidemia 02/24/2023    Diffuse ST segment depression 02/24/2023    Other forms of angina pectoris (HCC) 02/24/2023    Dog bite 10/10/2019    Wound infection 07/11/2019    Adjustment disorder with depressed mood 07/11/2019    Vitamin D insufficiency 10/15/2018    Acute folliculitis 06/05/2018    Closed fracture of phalanx of right fifth toe with  routine healing 06/05/2018    Osteopenia 03/31/2017    Acute bilateral low back pain without sciatica 12/23/2016    Postsurgical hypothyroidism 10/27/2016    Adjustment disorder with mixed anxiety and depressed mood 10/27/2016    Hot flashes 10/27/2016       Past Medical History:   Diagnosis Date    Insomnia     Thyroid disease         Past Surgical History:   Procedure Laterality Date    THYROIDECTOMY  2003       Family History   Problem Relation Age of Onset    Heart Disease Mother         Pacemaker 70's    Anxiety disorder Mother     Cancer Father     Thyroid Father         Hashimotos    Diabetes Maternal Grandmother     Heart Disease Maternal Grandfather     Diabetes Paternal Grandmother     Heart Disease Paternal Grandfather        Social History     Socioeconomic History    Marital status:      Spouse name: Not on file    Number of children: Not on file    Years of education: Not on file    Highest education level: Not on file   Occupational History    Not on file   Tobacco Use    Smoking status: Never    Smokeless tobacco: Never   Vaping Use    Vaping status: Not on file   Substance and Sexual Activity    Alcohol use: Not Currently     Comment: [facility]    Drug use: No    Sexual activity: Never   Other Topics Concern    Not on file   Social History Narrative    Not on file     Social Drivers of Health     Financial Resource Strain: Low Risk  (11/7/2023)    Received from [facility]    Overall Financial Resource Strain (CARDIA)     Difficulty of Paying Living Expenses: Not hard at all   Food Insecurity: No Food Insecurity (11/7/2023)    Received from [facility]    Hunger Vital Sign     Worried About Running Out of Food in the Last Year: Never true     Ran Out of Food in the Last Year: Never true   Transportation Needs: No Transportation Needs (11/7/2023)    Received from [facility]    PRAPARE - Transportation     Lack of Transportation  (Medical): No     Lack of Transportation (Non-Medical): No   Physical Activity: Insufficiently Active (11/7/2023)    Received from [facility]    Exercise Vital Sign     Days of Exercise per Week: 2 days     Minutes of Exercise per Session: 50 min   Stress: Stress Concern Present (11/7/2023)    Received from [facility]    [employer] [place] [employer] - Occupational Stress Questionnaire     Feeling of Stress : To some extent   Social Connections: Unknown (11/7/2023)    Received from [facility]    Social Connection and Isolation Panel [NHANES]     Frequency of Communication with Friends and Family: More than three times a week     Frequency of Social Gatherings with Friends and Family: Twice a week     Attends Christian Services: Patient declined     Active Member of Clubs or Organizations: Patient declined     Attends Club or Organization Meetings: Patient declined     Marital Status: Living with partner   Intimate Partner Violence: Not At Risk (11/7/2023)    Received from [facility]    Humiliation, Afraid, Rape, and Kick questionnaire     Fear of Current or Ex-Partner: No     Emotionally Abused: No     Physically Abused: No     Sexually Abused: No   Housing Stability: Low Risk  (11/7/2023)    Received from [facility]    Housing Stability Vital Sign     Unable to Pay for Housing in the Last Year: No     Number of Places Lived in the Last Year: 1     Unstable Housing in the Last Year: No       Current Outpatient Medications   Medication Sig Dispense Refill    sulfamethoxazole-trimethoprim (BACTRIM DS) 800-160 MG tablet Take 1 Tablet by mouth 2 times a day for 7 days. 14 Tablet 0    levothyroxine (SYNTHROID) 75 MCG Tab Take 75 mcg by mouth every morning on an empty stomach. 5 days per week      levothyroxine (SYNTHROID) 88 MCG Tab Take 88 mcg by mouth every morning on an empty stomach. 2  "days per week      valsartan (DIOVAN) 80 MG Tab Take 1 Tablet by mouth 2 times a day. 180 Tablet 3     No current facility-administered medications for this visit.        ALLERGIES: Codeine and Vicodin [hydrocodone-acetaminophen]    ROS  Constitutional: Denies fevers, Denies weight changes  Ears/Nose/Throat/Mouth: Denies nasal congestion or sore throat   Cardiovascular: Denies chest pain  Respiratory: Denies shortness of breath, Denies cough  Gastrointestinal/Hepatic: Denies nausea, vomiting  Sleep: see HPI      PHYSICAL EXAM  /78 (BP Location: Left arm, Patient Position: Sitting, BP Cuff Size: Adult)   Pulse 70   Resp 16   Ht 1.651 m (5' 5\")   Wt 97.7 kg (215 lb 4.8 oz)   LMP 02/01/2016   SpO2 96%   BMI 35.83 kg/m²   Appearance: Well-nourished, well-developed, no acute distress  Eyes:  No scleral icterus , EOMI  Musculoskeletal:  Grossly normal; gait and station normal; digits and nails normal  Skin:  No rashes, petechiae, cyanosis  Neurologic: without focal signs; oriented to person, time, place, and purpose; judgement intact      Medical Decision Making   Assessment and Plan  Kaleigh Figueroa is a 60 y.o.female  with hypothyroidism, hypertension, and obstructive sleep apnea on CPAP.  Presents today to follow-up regarding management of obstructive sleep apnea.    The medical record was reviewed.    Obstructive sleep apnea  Data from machine shows no usage in the last 30 days.  Patient reports she has been using it and is unsure why it is not transmitting.  Pt continues to use and benefit from machine.      Current Settings auto CPAP 5-15    PLAN:   -Order placed for mask and supplies   -Advised for her to take machine by DME office to have it evaluated as to why it is not transmitting.  -Advised to reach out via MyChart with questions     Has been advised to continue the current CPAP, clean equipment frequently, and get new mask and supplies as allowed by insurance and DME. Recommend an earlier " appointment, if significant treatment barriers develop.    Patients with APOORVA are at increased risk of cardiovascular disease including coronary artery disease, systemic arterial hypertension, pulmonary arterial hypertension, cardiac arrythmias, and stroke.     Return in about 3 months (around 7/2/2025).      Please note portions of this record was created using voice recognition software. I have made every reasonable attempt to correct obvious errors, but I expect that there are errors of grammar and possibly content I did not discover before finalizing the note.

## 2025-08-19 ENCOUNTER — HOSPITAL ENCOUNTER (EMERGENCY)
Facility: MEDICAL CENTER | Age: 60
End: 2025-08-19
Attending: EMERGENCY MEDICINE
Payer: COMMERCIAL

## 2025-08-19 ENCOUNTER — APPOINTMENT (OUTPATIENT)
Dept: RADIOLOGY | Facility: MEDICAL CENTER | Age: 60
End: 2025-08-19
Attending: EMERGENCY MEDICINE
Payer: COMMERCIAL

## 2025-08-19 ENCOUNTER — PHARMACY VISIT (OUTPATIENT)
Dept: PHARMACY | Facility: MEDICAL CENTER | Age: 60
End: 2025-08-19
Payer: COMMERCIAL

## 2025-08-19 VITALS
WEIGHT: 217.15 LBS | HEART RATE: 68 BPM | TEMPERATURE: 97.1 F | OXYGEN SATURATION: 96 % | SYSTOLIC BLOOD PRESSURE: 191 MMHG | HEIGHT: 65 IN | DIASTOLIC BLOOD PRESSURE: 94 MMHG | RESPIRATION RATE: 16 BRPM | BODY MASS INDEX: 36.18 KG/M2

## 2025-08-19 DIAGNOSIS — M25.50 ARTHRALGIA, UNSPECIFIED JOINT: ICD-10-CM

## 2025-08-19 DIAGNOSIS — R20.2 PARESTHESIA: ICD-10-CM

## 2025-08-19 DIAGNOSIS — N39.0 ACUTE UTI: Primary | ICD-10-CM

## 2025-08-19 LAB
ALBUMIN SERPL BCP-MCNC: 4.5 G/DL (ref 3.2–4.9)
ALBUMIN/GLOB SERPL: 1.4 G/DL
ALP SERPL-CCNC: 140 U/L (ref 30–99)
ALT SERPL-CCNC: 25 U/L (ref 2–50)
ANION GAP SERPL CALC-SCNC: 12 MMOL/L (ref 7–16)
APPEARANCE UR: CLEAR
AST SERPL-CCNC: 28 U/L (ref 12–45)
BACTERIA #/AREA URNS HPF: ABNORMAL /HPF
BASOPHILS # BLD AUTO: 1 % (ref 0–1.8)
BASOPHILS # BLD: 0.08 K/UL (ref 0–0.12)
BILIRUB SERPL-MCNC: 0.4 MG/DL (ref 0.1–1.5)
BILIRUB UR QL STRIP.AUTO: NEGATIVE
BUN SERPL-MCNC: 14 MG/DL (ref 8–22)
CALCIUM ALBUM COR SERPL-MCNC: 8.9 MG/DL (ref 8.5–10.5)
CALCIUM SERPL-MCNC: 9.3 MG/DL (ref 8.5–10.5)
CASTS URNS QL MICRO: ABNORMAL /LPF (ref 0–2)
CHLORIDE SERPL-SCNC: 106 MMOL/L (ref 96–112)
CO2 SERPL-SCNC: 24 MMOL/L (ref 20–33)
COLOR UR: YELLOW
CREAT SERPL-MCNC: 0.93 MG/DL (ref 0.5–1.4)
CRP SERPL HS-MCNC: 0.64 MG/DL (ref 0–0.75)
EKG IMPRESSION: NORMAL
EOSINOPHIL # BLD AUTO: 0.13 K/UL (ref 0–0.51)
EOSINOPHIL NFR BLD: 1.6 % (ref 0–6.9)
EPITHELIAL CELLS 1715: ABNORMAL /HPF (ref 0–5)
ERYTHROCYTE [DISTWIDTH] IN BLOOD BY AUTOMATED COUNT: 45.1 FL (ref 35.9–50)
ERYTHROCYTE [SEDIMENTATION RATE] IN BLOOD BY WESTERGREN METHOD: 17 MM/HOUR (ref 0–25)
GFR SERPLBLD CREATININE-BSD FMLA CKD-EPI: 70 ML/MIN/1.73 M 2
GLOBULIN SER CALC-MCNC: 3.3 G/DL (ref 1.9–3.5)
GLUCOSE SERPL-MCNC: 102 MG/DL (ref 65–99)
GLUCOSE UR STRIP.AUTO-MCNC: NEGATIVE MG/DL
HCT VFR BLD AUTO: 44.4 % (ref 37–47)
HGB BLD-MCNC: 14.3 G/DL (ref 12–16)
IMM GRANULOCYTES # BLD AUTO: 0.03 K/UL (ref 0–0.11)
IMM GRANULOCYTES NFR BLD AUTO: 0.4 % (ref 0–0.9)
KETONES UR STRIP.AUTO-MCNC: NEGATIVE MG/DL
LEUKOCYTE ESTERASE UR QL STRIP.AUTO: ABNORMAL
LYMPHOCYTES # BLD AUTO: 2.48 K/UL (ref 1–4.8)
LYMPHOCYTES NFR BLD: 30.3 % (ref 22–41)
MAGNESIUM SERPL-MCNC: 2.4 MG/DL (ref 1.5–2.5)
MCH RBC QN AUTO: 28.1 PG (ref 27–33)
MCHC RBC AUTO-ENTMCNC: 32.2 G/DL (ref 32.2–35.5)
MCV RBC AUTO: 87.4 FL (ref 81.4–97.8)
MICRO URNS: ABNORMAL
MONOCYTES # BLD AUTO: 0.63 K/UL (ref 0–0.85)
MONOCYTES NFR BLD AUTO: 7.7 % (ref 0–13.4)
NEUTROPHILS # BLD AUTO: 4.84 K/UL (ref 1.82–7.42)
NEUTROPHILS NFR BLD: 59 % (ref 44–72)
NITRITE UR QL STRIP.AUTO: NEGATIVE
NRBC # BLD AUTO: 0 K/UL
NRBC BLD-RTO: 0 /100 WBC (ref 0–0.2)
PH UR STRIP.AUTO: 6.5 [PH] (ref 5–8)
PLATELET # BLD AUTO: 296 K/UL (ref 164–446)
PMV BLD AUTO: 10.7 FL (ref 9–12.9)
POTASSIUM SERPL-SCNC: 4.1 MMOL/L (ref 3.6–5.5)
PROT SERPL-MCNC: 7.8 G/DL (ref 6–8.2)
PROT UR QL STRIP: NEGATIVE MG/DL
RBC # BLD AUTO: 5.08 M/UL (ref 4.2–5.4)
RBC # URNS HPF: ABNORMAL /HPF (ref 0–2)
RBC UR QL AUTO: NEGATIVE
SODIUM SERPL-SCNC: 142 MMOL/L (ref 135–145)
SP GR UR STRIP.AUTO: 1.02
TROPONIN T SERPL-MCNC: <6 NG/L (ref 6–19)
UROBILINOGEN UR STRIP.AUTO-MCNC: 1 EU/DL
WBC # BLD AUTO: 8.2 K/UL (ref 4.8–10.8)
WBC #/AREA URNS HPF: ABNORMAL /HPF

## 2025-08-19 PROCEDURE — 85652 RBC SED RATE AUTOMATED: CPT

## 2025-08-19 PROCEDURE — 81001 URINALYSIS AUTO W/SCOPE: CPT

## 2025-08-19 PROCEDURE — 99283 EMERGENCY DEPT VISIT LOW MDM: CPT

## 2025-08-19 PROCEDURE — 80053 COMPREHEN METABOLIC PANEL: CPT

## 2025-08-19 PROCEDURE — 83735 ASSAY OF MAGNESIUM: CPT

## 2025-08-19 PROCEDURE — 93005 ELECTROCARDIOGRAM TRACING: CPT | Mod: TC | Performed by: EMERGENCY MEDICINE

## 2025-08-19 PROCEDURE — 84484 ASSAY OF TROPONIN QUANT: CPT

## 2025-08-19 PROCEDURE — 87086 URINE CULTURE/COLONY COUNT: CPT

## 2025-08-19 PROCEDURE — 36415 COLL VENOUS BLD VENIPUNCTURE: CPT

## 2025-08-19 PROCEDURE — 71045 X-RAY EXAM CHEST 1 VIEW: CPT

## 2025-08-19 PROCEDURE — 86140 C-REACTIVE PROTEIN: CPT

## 2025-08-19 PROCEDURE — RXMED WILLOW AMBULATORY MEDICATION CHARGE: Performed by: EMERGENCY MEDICINE

## 2025-08-19 PROCEDURE — 85025 COMPLETE CBC W/AUTO DIFF WBC: CPT

## 2025-08-19 RX ORDER — CEPHALEXIN 500 MG/1
500 CAPSULE ORAL 2 TIMES DAILY
Qty: 10 CAPSULE | Refills: 0 | Status: ACTIVE | OUTPATIENT
Start: 2025-08-19 | End: 2025-08-24

## 2025-08-19 ASSESSMENT — FIBROSIS 4 INDEX: FIB4 SCORE: 1.89

## 2025-08-21 LAB
BACTERIA UR CULT: NORMAL
SIGNIFICANT IND 70042: NORMAL
SITE SITE: NORMAL
SOURCE SOURCE: NORMAL